# Patient Record
Sex: FEMALE | Race: WHITE | NOT HISPANIC OR LATINO | Employment: FULL TIME | URBAN - METROPOLITAN AREA
[De-identification: names, ages, dates, MRNs, and addresses within clinical notes are randomized per-mention and may not be internally consistent; named-entity substitution may affect disease eponyms.]

---

## 2017-02-01 ENCOUNTER — GENERIC CONVERSION - ENCOUNTER (OUTPATIENT)
Dept: OTHER | Facility: OTHER | Age: 52
End: 2017-02-01

## 2017-02-01 ENCOUNTER — ALLSCRIPTS OFFICE VISIT (OUTPATIENT)
Dept: OTHER | Facility: OTHER | Age: 52
End: 2017-02-01

## 2017-02-01 DIAGNOSIS — R10.2 PELVIC AND PERINEAL PAIN: ICD-10-CM

## 2017-02-01 DIAGNOSIS — Z12.31 ENCOUNTER FOR SCREENING MAMMOGRAM FOR MALIGNANT NEOPLASM OF BREAST: ICD-10-CM

## 2017-02-01 DIAGNOSIS — Z11.3 ENCOUNTER FOR SCREENING FOR INFECTIONS WITH PREDOMINANTLY SEXUAL MODE OF TRANSMISSION: ICD-10-CM

## 2017-02-01 DIAGNOSIS — Z01.419 ENCOUNTER FOR GYNECOLOGICAL EXAMINATION WITHOUT ABNORMAL FINDING: ICD-10-CM

## 2017-02-01 DIAGNOSIS — R14.0 ABDOMINAL DISTENSION (GASEOUS): ICD-10-CM

## 2017-02-01 DIAGNOSIS — Z13.1 ENCOUNTER FOR SCREENING FOR DIABETES MELLITUS: ICD-10-CM

## 2017-02-01 DIAGNOSIS — Z13.0 ENCOUNTER FOR SCREENING FOR DISEASES OF THE BLOOD AND BLOOD-FORMING ORGANS AND CERTAIN DISORDERS INVOLVING THE IMMUNE MECHANISM: ICD-10-CM

## 2017-02-01 DIAGNOSIS — Z11.59 ENCOUNTER FOR SCREENING FOR OTHER VIRAL DISEASES: ICD-10-CM

## 2017-02-01 DIAGNOSIS — Z13.220 ENCOUNTER FOR SCREENING FOR LIPOID DISORDERS: ICD-10-CM

## 2017-02-01 DIAGNOSIS — Z13.29 ENCOUNTER FOR SCREENING FOR OTHER SUSPECTED ENDOCRINE DISORDER: ICD-10-CM

## 2017-02-02 LAB
A/G RATIO (HISTORICAL): 1.4 (ref 1.1–2.5)
ALBUMIN SERPL BCP-MCNC: 4.1 G/DL (ref 3.5–5.5)
ALP SERPL-CCNC: 98 IU/L (ref 39–117)
ALT SERPL W P-5'-P-CCNC: 25 IU/L (ref 0–32)
AST SERPL W P-5'-P-CCNC: 22 IU/L (ref 0–40)
BASOPHILS # BLD AUTO: 0 X10E3/UL (ref 0–0.2)
BASOPHILS # BLD AUTO: 1 %
BILIRUB SERPL-MCNC: 0.4 MG/DL (ref 0–1.2)
BUN SERPL-MCNC: 14 MG/DL (ref 6–24)
BUN/CREA RATIO (HISTORICAL): 18 (ref 9–23)
CALCIUM SERPL-MCNC: 9.4 MG/DL (ref 8.7–10.2)
CHLORIDE SERPL-SCNC: 103 MMOL/L (ref 96–106)
CHOLEST SERPL-MCNC: 204 MG/DL (ref 100–199)
CO2 SERPL-SCNC: 22 MMOL/L (ref 18–29)
CREAT SERPL-MCNC: 0.78 MG/DL (ref 0.57–1)
DEPRECATED RDW RBC AUTO: 13 % (ref 12.3–15.4)
EGFR AFRICAN AMERICAN (HISTORICAL): 101 ML/MIN/1.73
EGFR-AMERICAN CALC (HISTORICAL): 88 ML/MIN/1.73
EOSINOPHIL # BLD AUTO: 0.1 X10E3/UL (ref 0–0.4)
EOSINOPHIL # BLD AUTO: 2 %
GLUCOSE SERPL-MCNC: 108 MG/DL (ref 65–99)
HCT VFR BLD AUTO: 39.4 % (ref 34–46.6)
HDLC SERPL-MCNC: 40 MG/DL
HGB BLD-MCNC: 13.3 G/DL (ref 11.1–15.9)
IMM.GRANULOCYTES (CD4/8) (HISTORICAL): 0 %
IMM.GRANULOCYTES (CD4/8) (HISTORICAL): 0 X10E3/UL (ref 0–0.1)
LDLC SERPL CALC-MCNC: 87 MG/DL (ref 0–99)
LYMPHOCYTES # BLD AUTO: 2.1 X10E3/UL (ref 0.7–3.1)
LYMPHOCYTES # BLD AUTO: 33 %
MCH RBC QN AUTO: 29.8 PG (ref 26.6–33)
MCHC RBC AUTO-ENTMCNC: 33.8 G/DL (ref 31.5–35.7)
MCV RBC AUTO: 88 FL (ref 79–97)
MONOCYTES # BLD AUTO: 0.4 X10E3/UL (ref 0.1–0.9)
MONOCYTES (HISTORICAL): 6 %
NEUTROPHILS # BLD AUTO: 3.7 X10E3/UL (ref 1.4–7)
NEUTROPHILS # BLD AUTO: 58 %
PLATELET # BLD AUTO: 247 X10E3/UL (ref 150–379)
POTASSIUM SERPL-SCNC: 4.4 MMOL/L (ref 3.5–5.2)
RBC (HISTORICAL): 4.47 X10E6/UL (ref 3.77–5.28)
SODIUM SERPL-SCNC: 142 MMOL/L (ref 134–144)
TOT. GLOBULIN, SERUM (HISTORICAL): 3 G/DL (ref 1.5–4.5)
TOTAL PROTEIN (HISTORICAL): 7.1 G/DL (ref 6–8.5)
TRIGL SERPL-MCNC: 386 MG/DL (ref 0–149)
VLDLC SERPL CALC-MCNC: 77 MG/DL (ref 5–40)
WBC # BLD AUTO: 6.3 X10E3/UL (ref 3.4–10.8)

## 2017-02-03 LAB — TSH SERPL DL<=0.05 MIU/L-ACNC: 2.18 UIU/ML (ref 0.45–4.5)

## 2017-02-06 ENCOUNTER — ALLSCRIPTS OFFICE VISIT (OUTPATIENT)
Dept: OTHER | Facility: OTHER | Age: 52
End: 2017-02-06

## 2017-02-08 ENCOUNTER — ALLSCRIPTS OFFICE VISIT (OUTPATIENT)
Dept: OTHER | Facility: OTHER | Age: 52
End: 2017-02-08

## 2017-02-10 LAB
ADEQUACY: (HISTORICAL): NORMAL
CHLAMYDIA DFA, NAA OR PCR (HISTORICAL): NEGATIVE
CLINICIAN PROVIDIED ICD 9 OR 10 (HISTORICAL): NORMAL
COMMENT (HISTORICAL): NORMAL
DIAGNOSIS (HISTORICAL): NORMAL
GC, DNA PROB (HISTORICAL): NEGATIVE
HPV HIGH RISK (HISTORICAL): NEGATIVE
NOTE: (HISTORICAL): NORMAL
PERFORMED BY (HISTORICAL): NORMAL
TEST INFORMATION (HISTORICAL): NORMAL

## 2017-02-23 ENCOUNTER — GENERIC CONVERSION - ENCOUNTER (OUTPATIENT)
Dept: OTHER | Facility: OTHER | Age: 52
End: 2017-02-23

## 2017-03-23 ENCOUNTER — GENERIC CONVERSION - ENCOUNTER (OUTPATIENT)
Dept: OTHER | Facility: OTHER | Age: 52
End: 2017-03-23

## 2017-04-26 ENCOUNTER — GENERIC CONVERSION - ENCOUNTER (OUTPATIENT)
Dept: OTHER | Facility: OTHER | Age: 52
End: 2017-04-26

## 2017-04-26 ENCOUNTER — ALLSCRIPTS OFFICE VISIT (OUTPATIENT)
Dept: OTHER | Facility: OTHER | Age: 52
End: 2017-04-26

## 2017-04-26 DIAGNOSIS — L02.214 CUTANEOUS ABSCESS OF GROIN: ICD-10-CM

## 2017-04-29 LAB
CULTURE RESULT (HISTORICAL): ABNORMAL
RESULT 1 (HISTORICAL): ABNORMAL
SUSCEP. REFLEX (HISTORICAL): ABNORMAL

## 2017-05-16 ENCOUNTER — HOSPITAL ENCOUNTER (OUTPATIENT)
Dept: MAMMOGRAPHY | Facility: MEDICAL CENTER | Age: 52
Discharge: HOME | End: 2017-05-16
Payer: COMMERCIAL

## 2017-05-16 DIAGNOSIS — Z12.31 ENCOUNTER FOR SCREENING MAMMOGRAM FOR MALIGNANT NEOPLASM OF BREAST: ICD-10-CM

## 2017-05-16 DIAGNOSIS — Z01.419 ENCOUNTER FOR GYNECOLOGICAL EXAMINATION WITHOUT ABNORMAL FINDING: ICD-10-CM

## 2017-05-16 PROCEDURE — 77063 BREAST TOMOSYNTHESIS BI: CPT

## 2017-05-16 PROCEDURE — G0202 SCR MAMMO BI INCL CAD: HCPCS

## 2017-05-23 ENCOUNTER — GENERIC CONVERSION - ENCOUNTER (OUTPATIENT)
Dept: OTHER | Facility: OTHER | Age: 52
End: 2017-05-23

## 2017-08-01 ENCOUNTER — ALLSCRIPTS OFFICE VISIT (OUTPATIENT)
Dept: OTHER | Facility: OTHER | Age: 52
End: 2017-08-01

## 2017-08-01 DIAGNOSIS — R14.0 ABDOMINAL DISTENSION (GASEOUS): ICD-10-CM

## 2017-08-01 DIAGNOSIS — R10.84 GENERALIZED ABDOMINAL PAIN: ICD-10-CM

## 2017-08-10 ENCOUNTER — HOSPITAL ENCOUNTER (OUTPATIENT)
Dept: ULTRASOUND IMAGING | Facility: HOSPITAL | Age: 52
Discharge: HOME | End: 2017-08-10
Attending: FAMILY MEDICINE
Payer: COMMERCIAL

## 2017-08-10 DIAGNOSIS — R10.84 GENERALIZED ABDOMINAL PAIN: ICD-10-CM

## 2017-08-10 DIAGNOSIS — R14.0 ABDOMINAL DISTENSION (GASEOUS): ICD-10-CM

## 2017-08-10 PROCEDURE — 76830 TRANSVAGINAL US NON-OB: CPT

## 2017-08-10 PROCEDURE — 76700 US EXAM ABDOM COMPLETE: CPT

## 2017-08-10 PROCEDURE — 76856 US EXAM PELVIC COMPLETE: CPT

## 2017-08-14 ENCOUNTER — GENERIC CONVERSION - ENCOUNTER (OUTPATIENT)
Dept: OTHER | Facility: OTHER | Age: 52
End: 2017-08-14

## 2017-08-30 ENCOUNTER — ALLSCRIPTS OFFICE VISIT (OUTPATIENT)
Dept: OTHER | Facility: OTHER | Age: 52
End: 2017-08-30

## 2018-01-09 NOTE — MISCELLANEOUS
Message   Recorded as Task   Date: 05/22/2017 09:24 PM, Created By: Trina Gillespie   Task Name: Follow Up   Assigned To: Amy Hayes   Regarding Patient: Wing Akbar, Status: Active   Comment:    Trina Gillespie - 22 May 2017 9:24 PM     TASK CREATED  pts 3D mammogram is normal, she does have an elevated breast cancer risk due to her dense tissue and fam hx   could consider genetic counseling ( unless her sister had negative testing), we can discuss other options at her yearly also   Yudith Jewell - 23 May 2017 3:20 PM     TASK EDITED  pt informed    she will talk with her sister and call back if she needs any info        Active Problems    1  Abdominal bloating (787 3) (R14 0)   2  Abscess of groin, left (682 2) (L02 214)   3  Allergic rhinitis (477 9) (J30 9)   4  Cervical cancer screening (V76 2) (Z12 4)   5  Elevated fasting glucose (790 21) (R73 01)   6  Encounter for screening colonoscopy (V76 51) (Z12 11)   7  Female pelvic pain (625 9) (R10 2)   8  Denied: History of self breast exam   9  Hypertriglyceridemia (272 1) (E78 1)   10  Mild asthma exacerbation (493 92) (J45 901)   11  Need for hepatitis C screening test (V73 89) (Z11 59)   12  Screen for colon cancer (V76 51) (Z12 11)   13  Upper respiratory infection (465 9) (J06 9)    Current Meds   1  LevoFLOXacin 500 MG Oral Tablet (Levaquin); TAKE 1 TABLET DAILY AS DIRECTED; Therapy: 88WAJ0123 to (Mayda Scott)  Requested for: 33YUZ4116; Last   RM:82CPB4980 Ordered   2  Sulfamethoxazole-Trimethoprim 800-160 MG Oral Tablet; TAKE 1 TABLET TWICE DAILY   WITH FOOD; Therapy: 60RYN3214 to (Sharmaine Wall)  Requested for: 93Oxl4234; Last   Rx:32Hrj5146 Ordered    Allergies    1   Animal dander - Cats    Signatures   Electronically signed by : [facility], ; May 23 2017  3:20PM EST                       (Author)

## 2018-01-11 NOTE — PROGRESS NOTES
Assessment    1  Encounter for preventive health examination (V70 0) (Z00 00)   2  Abdominal bloating (787 3) (R14 0)   3  Allergic rhinitis (477 9) (J30 9)   4  Upper respiratory infection (465 9) (J06 9)   5  Hypertriglyceridemia (272 1) (E78 1)   6  Elevated fasting glucose (790 21) (R73 01)   7  Need for hepatitis C screening test (V73 89) (Z11 59)    Plan  Abdominal bloating    · * US ABDOMEN COMPLETE; Status:Hold For - Scheduling; Requested for:44Tuo1877;   Elevated fasting glucose    · (LC) BMP8+eGFR; Status:Active; Requested for:08Feb2017;    · (LC) Hemoglobin A1c; Status:Active; Requested for:08Feb2017;   Hypertriglyceridemia    · (LC) Lipid Panel; Status:Active; Requested for:66Qkw0501;   Need for hepatitis C screening test    · (1) HEP C ANTIBODY; Status:Active; Requested for:62Mqn1421;     1  Health maintenance-the patient is up-to-date on her Pap, she had it yesterday with her GYN  She was given a prescription for a 3-D mammogram at that time which she will schedule  She has had her colonoscopy 2 years ago which she says was completely normal  She has now had her labs done and will only need hepatitis C screening with her next set of labs  2  Hypertriglyceridemia-we reviewed her lab results in detail and discussed that her HDL and LDL are excellent and her triglycerides were high  It only been fasting for 4 hours when she got her blood work done so we are going to repeat this in 3 months and she will fast overnight  She is obese and does seem to have a diet high in fat so she is also going to work on a lower fat diet in the meantime  3  Impaired fasting glucose-her sugar was 108 after fasting for 4 hours  She does admit that she has had a history of prediabetes in the past  When I repeat her blood work in 3 months and going to get an A1c at that time and repeat fasting sugar  4  Viral upper respiratory infection-the patient is already on an antibiotic for her abscess which is healing well   She has only been sick for a day now and I think that this is a viral upper respiratory infection in combination with some allergies  She is not doing anything for her allergies and I advised that she use nasal saline as needed  She does not like to take medications and does not want to take an antihistamine  5  Abdominal bloating and vomiting-the patient has a very strong family history of cholecystitis, both her siblings had their gallbladders removed  Her symptoms are always after eating and generally after a "heavy meal"  I am going to get an ultrasound of the abdomen to evaluate her gallbladder  Chief Complaint  45YEAR OLD PE  PATIENT IS NOT FEELING WELL  C/O SINUS PAIN AND CONGESTION  History of Present Illness  HPI: pt is here today for a physical   she saw her gyn yesterday  she had her blood work done the last time she was here  she has been taking te abx for her small groin abcess and this has resolved    she has not been sick for 1 day with itchy throat, sneezing, some sinus pressure  no fevers - feels warm now, she says  she sat under a vent working late at work last night and feels this did it    her main complaint is abd bloating  it happens after she eats certain things  happens with "heavy" things  it happens with pasta and bread, heavy fatty steaks  yoselin with wine as well  can't tolerate ham/pork  if she has these things she will vomit  dull ache at times in the middle of her abd  sometimes up to the right shoulder blade  she does have a family h/o gallstones      Review of Systems    Constitutional: as noted in HPI  Eyes: eyesight problems and wears glasses and needs an update  ENT: has TMJ, crooked bite from teen years; gets wax at times which causes clogging; has a feeling of fullness in her left ear a lot of the time for years, but as noted in HPI and no hearing loss  Cardiovascular: as noted in HPI and no chest pain     Respiratory: only SOB is due to inactivity, but no shortness of breath  Gastrointestinal: as noted in HPI  Genitourinary: no dysuria  Musculoskeletal: no arthralgias and no myalgias  Integumentary: no rashes  Neurological: headache, but as noted in HPI, no fainting and no difficulty walking  Psychiatric: no anxiety and no depression  ROS reviewed  Active Problems    1  Cervical cancer screening (V76 2) (Z12 4)   2  Female pelvic pain (625 9) (R10 2)   3  Denied: History of self breast exam   4   Mild asthma exacerbation (493 92) (J45 901)    Past Medical History    · History of Anxiety (300 00) (F41 9)   · History of  0 (V49 89) (Z78 9)   · History of depression (V11 8) (Z86 59)   · History of fatigue (V13 89) (T89 444)   · History of psoriasis (V13 3) (Z87 2)   · Denied: History of self breast exam   · History of sinusitis (V12 69) (Z87 09)   · History of urinary frequency (V13 09) (J93 365)   · History of wheezing (V12 69) (L94 722)   · History of Irregular heart beat (427 9) (I49 9)   · History of Non-healing skin lesion of nose (709 9) (L98 9)   · History of Screening for diabetes mellitus (V77 1) (Z13 1)   · History of Screening for hypothyroidism (V77 0) (Z13 29)   · History of Screening for iron deficiency anemia (V78 0) (Z13 0)   · History of SOB (shortness of breath) on exertion (786 05) (R06 02)   · History of Swollen R ankle (719 07) (M25 471)   · History of Weight gain (783 1) (R63 5)    Surgical History    · Denied: History Of Prior Surgery    Family History  Mother    · Family history of heart disease (V17 49) (Z80 55)  Father    · Family history of malignant melanoma (V16 8) (Z80 8)  Sister    · Family history of breast cancer (V16 3) (Z80 3)   · Family history of ovarian cancer (V16 41) (Z80 41)  Brother    · Family history of diabetes mellitus (V18 0) (Z83 3)    Social History    · Always uses seat belt   · Caffeine use (V49 89) (F15 90)   · Drinks coffee   · Drinks wine (V49 89) (Z78 9)   · Denied: History of Drug use   · Denied: History of Finding of    · Non smoker (V49 89) (Z78 9)   · Single   · Social alcohol use (Z78 9)    Current Meds   1  Cefuroxime Axetil 500 MG Oral Tablet; TAKE 1 TABLET EVERY 12 HOURS DAILY; Therapy: 23MWD7093 to (Evaluate:15Ljm7369)  Requested for: 94EVO0459; Last   Rx:2017 Ordered   2  ProAir  (90 Base) MCG/ACT Inhalation Aerosol Solution; INHALE 2 PUFFS   EVERY 4 HOURS AS NEEDED FOR COUGH AND WHEEZE;   Therapy: 52ESE5077 to (Last Rx:2017)  Requested for: 61PDD2687 Ordered    Allergies    1  Animal dander - Cats    Vitals   Recorded: 71WYW9083 06:09PM   Temperature 99 7 F   Heart Rate 94   Systolic 886   Diastolic 80   Height 5 ft 5 in   Weight 219 lb 4 oz   BMI Calculated 36 49   BSA Calculated 2 06     Physical Exam    Constitutional   General appearance: Abnormal   well developed, obese and well hydrated  Head and Face   Head and face: Normal     Palpation of the face and sinuses: No sinus tenderness  Eyes   Conjunctiva and lids: No swelling, erythema or discharge  Ears, Nose, Mouth, and Throat   External inspection of ears and nose: Normal     Otoscopic examination: Tympanic membranes translucent with normal light reflex  Canals patent without erythema  Nasal mucosa, septum, and turbinates: Abnormal   There was clear rhinorrhea from both nares  The bilateral nasal mucosa was red, but not boggy and not edematous  Oropharynx: Abnormal   The posterior pharynx was erythematous, but did not have an exudate  The tonsils were normal    Neck   Neck: Supple, symmetric, trachea midline, no masses  Thyroid: Normal, no thyromegaly  Pulmonary   Respiratory effort: No increased work of breathing or signs of respiratory distress  Auscultation of lungs: Clear to auscultation  Cardiovascular   Auscultation of heart: Normal rate and rhythm, normal S1 and S2, no murmurs      Examination of extremities for edema and/or varicosities: Normal     Abdomen   Abdomen: Abnormal   The abdomen was obese  Bowel sounds were normal  There was mild tenderness that was diffuse and in the epigastric area  There was a negative Bonner's sign  no masses palpated  Musculoskeletal   Gait and station: Normal     Joints, bones, and muscles: Normal     Range of motion: Normal     Stability: Normal     Skin   Skin and subcutaneous tissue: Normal without rashes or lesions  other than the lesion in her nostril noted in the prior visits office note  Neurologic   Coordination: Normal finger to nose and heel to shin  Psychiatric   Judgment and insight: Normal     Orientation to person, place, and time: Normal     Recent and remote memory: Intact      Mood and affect: Normal        Signatures   Electronically signed by : JOANN Garcia ; Feb 8 2017  6:56PM EST                       (Author)

## 2018-01-11 NOTE — RESULT NOTES
Verified Results  * US ABDOMEN COMPLETE 10Aug2017 07:56AM Rowena Juarez Order Number: [id number]    - Patient Instructions: To schedule this appointment, please contact Central Scheduling at [phone]  Test Name Result Flag Reference   US ABDOMEN COMPLETE (Report)     ABDOMEN ULTRASOUND, COMPLETE     INDICATION: 50-year-old female, pain and distention  COMPARISON: None  TECHNIQUE:  Real-time ultrasound of the abdomen was performed with a curvilinear transducer with both volumetric sweeps and still imaging techniques  FINDINGS:     PANCREAS: Visualized portions of the pancreas are within normal limits  AORTA AND IVC: Visualized portions are normal for patient age  LIVER:   Size: Mildly enlarged  The liver measures 17 9 cm in the midclavicular line  Contour: Surface contour is smooth  Parenchyma: There is mild diffuse increased echogenicity with smooth echotexture, without significant beam attenuation or loss of periportal echogenicity  Most consistent with mild hepatic steatosis  No evidence of suspicious mass  The main portal vein is patent and hepatopetal       BILIARY:   The gallbladder is normal in caliber  No wall thickening or pericholecystic fluid  No stones or sludge identified  Sonographic Omega Nyhan sign is negative  No intrahepatic biliary dilatation  CBD measures 2 6 mm  No choledocholithiasis  KIDNEY:    Right kidney measures 10 5 x 4 4 cm  Within normal limits  Left kidney measures 10 4 x 4 9 cm  Within normal limits  SPLEEN:    Measures 10 5 x 4 6 x 10 8 cm  Within normal limits  ASCITES: None  IMPRESSION:       1  Prominent, fatty liver  2  No cholelithiasis or biliary ductal obstruction         Workstation performed: [id number]     Signed by:   Medina Beckman MD   8/14/17

## 2018-01-12 VITALS
TEMPERATURE: 98.1 F | HEART RATE: 100 BPM | WEIGHT: 219.5 LBS | DIASTOLIC BLOOD PRESSURE: 76 MMHG | HEIGHT: 65 IN | OXYGEN SATURATION: 97 % | BODY MASS INDEX: 36.57 KG/M2 | SYSTOLIC BLOOD PRESSURE: 122 MMHG

## 2018-01-13 VITALS
TEMPERATURE: 99.7 F | HEIGHT: 65 IN | WEIGHT: 219.25 LBS | BODY MASS INDEX: 36.53 KG/M2 | SYSTOLIC BLOOD PRESSURE: 122 MMHG | DIASTOLIC BLOOD PRESSURE: 80 MMHG | HEART RATE: 94 BPM

## 2018-01-13 VITALS
HEIGHT: 65 IN | SYSTOLIC BLOOD PRESSURE: 126 MMHG | BODY MASS INDEX: 37.49 KG/M2 | WEIGHT: 225 LBS | TEMPERATURE: 97.6 F | DIASTOLIC BLOOD PRESSURE: 76 MMHG | HEART RATE: 87 BPM | OXYGEN SATURATION: 97 %

## 2018-01-13 VITALS
OXYGEN SATURATION: 98 % | DIASTOLIC BLOOD PRESSURE: 82 MMHG | HEART RATE: 77 BPM | TEMPERATURE: 98.6 F | BODY MASS INDEX: 36.4 KG/M2 | HEIGHT: 65 IN | SYSTOLIC BLOOD PRESSURE: 116 MMHG | WEIGHT: 218.5 LBS

## 2018-01-13 VITALS
HEIGHT: 65 IN | DIASTOLIC BLOOD PRESSURE: 78 MMHG | WEIGHT: 218 LBS | SYSTOLIC BLOOD PRESSURE: 118 MMHG | BODY MASS INDEX: 36.32 KG/M2

## 2018-01-15 VITALS
OXYGEN SATURATION: 98 % | WEIGHT: 221 LBS | HEART RATE: 90 BPM | HEIGHT: 66 IN | BODY MASS INDEX: 35.52 KG/M2 | SYSTOLIC BLOOD PRESSURE: 130 MMHG | DIASTOLIC BLOOD PRESSURE: 78 MMHG | TEMPERATURE: 97.1 F

## 2019-01-02 ENCOUNTER — OFFICE VISIT (OUTPATIENT)
Dept: FAMILY MEDICINE CLINIC | Facility: CLINIC | Age: 54
End: 2019-01-02
Payer: COMMERCIAL

## 2019-01-02 VITALS
OXYGEN SATURATION: 98 % | BODY MASS INDEX: 36.28 KG/M2 | WEIGHT: 218 LBS | SYSTOLIC BLOOD PRESSURE: 122 MMHG | HEART RATE: 77 BPM | DIASTOLIC BLOOD PRESSURE: 76 MMHG | TEMPERATURE: 97.8 F

## 2019-01-02 DIAGNOSIS — R73.01 ELEVATED FASTING GLUCOSE: ICD-10-CM

## 2019-01-02 DIAGNOSIS — E78.1 HYPERTRIGLYCERIDEMIA: ICD-10-CM

## 2019-01-02 DIAGNOSIS — R53.81 MALAISE: Primary | ICD-10-CM

## 2019-01-02 DIAGNOSIS — R51.9 NONINTRACTABLE EPISODIC HEADACHE, UNSPECIFIED HEADACHE TYPE: ICD-10-CM

## 2019-01-02 DIAGNOSIS — R11.0 NAUSEA: ICD-10-CM

## 2019-01-02 DIAGNOSIS — K75.81 NASH (NONALCOHOLIC STEATOHEPATITIS): ICD-10-CM

## 2019-01-02 DIAGNOSIS — R10.84 GENERALIZED ABDOMINAL PAIN: ICD-10-CM

## 2019-01-02 DIAGNOSIS — L30.9 DERMATITIS: ICD-10-CM

## 2019-01-02 PROBLEM — K58.2 IRRITABLE BOWEL SYNDROME WITH BOTH CONSTIPATION AND DIARRHEA: Status: ACTIVE | Noted: 2017-08-01

## 2019-01-02 PROBLEM — J30.9 ALLERGIC RHINITIS: Status: ACTIVE | Noted: 2017-02-08

## 2019-01-02 PROBLEM — D25.9 UTERINE MYOMA: Status: ACTIVE | Noted: 2017-08-30

## 2019-01-02 PROCEDURE — 99214 OFFICE O/P EST MOD 30 MIN: CPT | Performed by: FAMILY MEDICINE

## 2019-01-02 NOTE — PROGRESS NOTES
Subjective:   Chief Complaint   Patient presents with    GI Problem     Patient believes she is having a gallbladder issue  Cannot eat any fatty or heavy food without becoming nauseous and having pains near her right shoulder  States that her stool is smaller portions and darker when she has these episodes   Rash     blotchy, dry patches of skin all over the body  Patient states that she has been having a hard time clotting after cuts as well and has not been healing properly  Patient ID: Sean Jurado is a 46 y o  female  The pt is here today because she has been having some trouble with nausea and stomach upset    Heavy meats  Otis with wine  Feels tired, nauseas, no vomiting  Prick in the upper right shoulder  Generally does not feel well  No associated fevers  Will often happen the next day after eating these things  If she mixes it with wine it happens right away    She will have some changes in her stool when this happens  Seems smaller and darker   No heartburn  Headaches and malaise  General abd discomfort    She is realizing she has a dairy allergy  When she has heavy cream she gets nauseas    She is also getting red blotches on her skin - arms and legs  They don't itch    She has had some SOB in the am if she sleeps on her stomach  She has gained some weight     Colonoscopy 2015 - completely normal, 10 year follow up          The following portions of the patient's history were reviewed and updated as appropriate: allergies, current medications, past family history, past medical history, past social history, past surgical history and problem list     Review of Systems      Objective:  Vitals:    01/02/19 1811   BP: 122/76   Pulse: 77   Temp: 97 8 °F (36 6 °C)   SpO2: 98%   Weight: 98 9 kg (218 lb)      Physical Exam   Constitutional: She is oriented to person, place, and time  She appears well-developed and well-nourished  No distress     Neurological: She is alert and oriented to person, place, and time  No cranial nerve deficit  Coordination normal    Skin: Skin is warm and dry  No rash (Multiple erythematous irregularly-shaped macules on both her arms and legs, brighter erythema on the legs, some scale) noted  She is not diaphoretic  No erythema  Psychiatric: She has a normal mood and affect  Her behavior is normal  Judgment and thought content normal          Assessment/Plan:    No problem-specific Assessment & Plan notes found for this encounter  Diagnoses and all orders for this visit:    Malaise  Nausea  Dermatitis  -     CBC and differential; Future  -     Comprehensive metabolic panel; Future  -     Hepatitis C antibody; Future  -     TSH, 3rd generation with Free T4 reflex; Future  -     Lipid Panel with Direct LDL reflex; Future  -     Lyme Antibody Profile with reflex to WB; Future  -     NAMITA Screen w/ Reflex to Titer/Pattern; Future  -     Hemoglobin A1C; Future    Given the patient's multiple relatively vague symptoms I am going to go ahead and get a full panel of lab work including some rheumatologic labs  It is possible that the lesions on her arms and legs are fungal in nature and I advised she can use over-the-counter clotrimazole while we are waiting on lab results to see if this helps at all  Elevated fasting glucose  -     Hemoglobin A1C; Future    Hypertriglyceridemia  -     Lipid Panel with Direct LDL reflex; Future    CARR (nonalcoholic steatohepatitis)  -     Comprehensive metabolic panel; Future    Nonintractable episodic headache, unspecified headache type  -     Lyme Antibody Profile with reflex to WB; Future  -     NAMITA Screen w/ Reflex to Titer/Pattern; Future  -     Hemoglobin A1C; Future    Generalized abdominal pain  -     US abdomen complete;  Future

## 2019-01-03 NOTE — ASSESSMENT & PLAN NOTE
She is definitely concern that she could have diabetes  She has gained weight and does know that she had a slightly elevated sugar with her last set of labs over a year ago  She admits to eating quite a bit of sugar as well  She has been thinking that her rash could possibly be related to something like diabetes  I will go ahead and get an A1c with her upcoming lab work

## 2019-01-03 NOTE — ASSESSMENT & PLAN NOTE
She had a fatty liver on previous ultrasound when she was having generalized abdominal pain little over a year ago  If she continues to have this, especially if her liver enzymes are elevated and given her recent symptoms I will likely refer her to gastroenterology

## 2019-02-18 ENCOUNTER — APPOINTMENT (OUTPATIENT)
Dept: LAB | Facility: HOSPITAL | Age: 54
End: 2019-02-18
Attending: FAMILY MEDICINE
Payer: COMMERCIAL

## 2019-02-18 DIAGNOSIS — R53.81 MALAISE: ICD-10-CM

## 2019-02-18 DIAGNOSIS — R11.0 NAUSEA: ICD-10-CM

## 2019-02-18 DIAGNOSIS — K75.81 NASH (NONALCOHOLIC STEATOHEPATITIS): ICD-10-CM

## 2019-02-18 DIAGNOSIS — E78.1 HYPERTRIGLYCERIDEMIA: ICD-10-CM

## 2019-02-18 DIAGNOSIS — L30.9 DERMATITIS: ICD-10-CM

## 2019-02-18 DIAGNOSIS — R51.9 NONINTRACTABLE EPISODIC HEADACHE, UNSPECIFIED HEADACHE TYPE: ICD-10-CM

## 2019-02-18 DIAGNOSIS — R73.01 ELEVATED FASTING GLUCOSE: ICD-10-CM

## 2019-02-18 LAB
ALBUMIN SERPL BCP-MCNC: 3.8 G/DL (ref 3.5–5)
ALP SERPL-CCNC: 109 U/L (ref 46–116)
ALT SERPL W P-5'-P-CCNC: 27 U/L (ref 12–78)
ANION GAP SERPL CALCULATED.3IONS-SCNC: 10 MMOL/L (ref 4–13)
AST SERPL W P-5'-P-CCNC: 19 U/L (ref 5–45)
BASOPHILS # BLD AUTO: 0.05 THOUSANDS/ΜL (ref 0–0.1)
BASOPHILS NFR BLD AUTO: 1 % (ref 0–1)
BILIRUB SERPL-MCNC: 0.7 MG/DL (ref 0.2–1)
BUN SERPL-MCNC: 14 MG/DL (ref 5–25)
CALCIUM SERPL-MCNC: 8.5 MG/DL (ref 8.3–10.1)
CHLORIDE SERPL-SCNC: 105 MMOL/L (ref 100–108)
CHOLEST SERPL-MCNC: 200 MG/DL (ref 50–200)
CO2 SERPL-SCNC: 28 MMOL/L (ref 21–32)
CREAT SERPL-MCNC: 0.82 MG/DL (ref 0.6–1.3)
EOSINOPHIL # BLD AUTO: 0.08 THOUSAND/ΜL (ref 0–0.61)
EOSINOPHIL NFR BLD AUTO: 2 % (ref 0–6)
ERYTHROCYTE [DISTWIDTH] IN BLOOD BY AUTOMATED COUNT: 11.9 % (ref 11.6–15.1)
EST. AVERAGE GLUCOSE BLD GHB EST-MCNC: 117 MG/DL
GFR SERPL CREATININE-BSD FRML MDRD: 81 ML/MIN/1.73SQ M
GLUCOSE P FAST SERPL-MCNC: 98 MG/DL (ref 65–99)
HBA1C MFR BLD: 5.7 % (ref 4.2–6.3)
HCT VFR BLD AUTO: 41.6 % (ref 34.8–46.1)
HCV AB SER QL: NORMAL
HDLC SERPL-MCNC: 59 MG/DL (ref 40–60)
HGB BLD-MCNC: 14 G/DL (ref 11.5–15.4)
IMM GRANULOCYTES # BLD AUTO: 0.02 THOUSAND/UL (ref 0–0.2)
IMM GRANULOCYTES NFR BLD AUTO: 0 % (ref 0–2)
LDLC SERPL CALC-MCNC: 119 MG/DL (ref 0–100)
LYMPHOCYTES # BLD AUTO: 1.62 THOUSANDS/ΜL (ref 0.6–4.47)
LYMPHOCYTES NFR BLD AUTO: 30 % (ref 14–44)
MCH RBC QN AUTO: 29.8 PG (ref 26.8–34.3)
MCHC RBC AUTO-ENTMCNC: 33.7 G/DL (ref 31.4–37.4)
MCV RBC AUTO: 89 FL (ref 82–98)
MONOCYTES # BLD AUTO: 0.42 THOUSAND/ΜL (ref 0.17–1.22)
MONOCYTES NFR BLD AUTO: 8 % (ref 4–12)
NEUTROPHILS # BLD AUTO: 3.29 THOUSANDS/ΜL (ref 1.85–7.62)
NEUTS SEG NFR BLD AUTO: 59 % (ref 43–75)
NRBC BLD AUTO-RTO: 0 /100 WBCS
PLATELET # BLD AUTO: 203 THOUSANDS/UL (ref 149–390)
PMV BLD AUTO: 9.8 FL (ref 8.9–12.7)
POTASSIUM SERPL-SCNC: 4 MMOL/L (ref 3.5–5.3)
PROT SERPL-MCNC: 7.5 G/DL (ref 6.4–8.2)
RBC # BLD AUTO: 4.7 MILLION/UL (ref 3.81–5.12)
SODIUM SERPL-SCNC: 143 MMOL/L (ref 136–145)
TRIGL SERPL-MCNC: 112 MG/DL
TSH SERPL DL<=0.05 MIU/L-ACNC: 2.56 UIU/ML (ref 0.36–3.74)
WBC # BLD AUTO: 5.48 THOUSAND/UL (ref 4.31–10.16)

## 2019-02-18 PROCEDURE — 86038 ANTINUCLEAR ANTIBODIES: CPT

## 2019-02-18 PROCEDURE — 83036 HEMOGLOBIN GLYCOSYLATED A1C: CPT

## 2019-02-18 PROCEDURE — 85025 COMPLETE CBC W/AUTO DIFF WBC: CPT

## 2019-02-18 PROCEDURE — 80053 COMPREHEN METABOLIC PANEL: CPT

## 2019-02-18 PROCEDURE — 36415 COLL VENOUS BLD VENIPUNCTURE: CPT

## 2019-02-18 PROCEDURE — 84443 ASSAY THYROID STIM HORMONE: CPT

## 2019-02-18 PROCEDURE — 86803 HEPATITIS C AB TEST: CPT

## 2019-02-18 PROCEDURE — 80061 LIPID PANEL: CPT

## 2019-02-18 PROCEDURE — 86618 LYME DISEASE ANTIBODY: CPT

## 2019-02-19 LAB
B BURGDOR IGG SER IA-ACNC: 0.11
B BURGDOR IGM SER IA-ACNC: 0.26

## 2019-02-20 LAB — RYE IGE QN: NEGATIVE

## 2019-04-18 ENCOUNTER — TELEPHONE (OUTPATIENT)
Dept: FAMILY MEDICINE CLINIC | Facility: CLINIC | Age: 54
End: 2019-04-18

## 2019-07-03 ENCOUNTER — OFFICE VISIT (OUTPATIENT)
Dept: FAMILY MEDICINE CLINIC | Facility: CLINIC | Age: 54
End: 2019-07-03
Payer: COMMERCIAL

## 2019-07-03 VITALS
DIASTOLIC BLOOD PRESSURE: 78 MMHG | HEART RATE: 104 BPM | SYSTOLIC BLOOD PRESSURE: 104 MMHG | OXYGEN SATURATION: 97 % | WEIGHT: 221.25 LBS | HEIGHT: 66 IN | BODY MASS INDEX: 35.56 KG/M2 | TEMPERATURE: 97.4 F

## 2019-07-03 DIAGNOSIS — H93.12 TINNITUS, LEFT: ICD-10-CM

## 2019-07-03 DIAGNOSIS — Z00.00 ANNUAL PHYSICAL EXAM: Primary | ICD-10-CM

## 2019-07-03 DIAGNOSIS — R60.0 LOWER EXTREMITY EDEMA: ICD-10-CM

## 2019-07-03 DIAGNOSIS — Z12.39 SCREENING FOR BREAST CANCER: ICD-10-CM

## 2019-07-03 DIAGNOSIS — I87.2 PERIPHERAL VENOUS INSUFFICIENCY: ICD-10-CM

## 2019-07-03 DIAGNOSIS — L20.84 INTRINSIC ECZEMA: ICD-10-CM

## 2019-07-03 DIAGNOSIS — H83.02 INNER EAR INFLAMMATION, LEFT: ICD-10-CM

## 2019-07-03 DIAGNOSIS — R06.02 SHORTNESS OF BREATH: ICD-10-CM

## 2019-07-03 PROCEDURE — 99396 PREV VISIT EST AGE 40-64: CPT | Performed by: FAMILY MEDICINE

## 2019-07-03 RX ORDER — TRIAMCINOLONE ACETONIDE 1 MG/G
CREAM TOPICAL 2 TIMES DAILY
Qty: 80 G | Refills: 2 | Status: SHIPPED | OUTPATIENT
Start: 2019-07-03 | End: 2020-09-02 | Stop reason: ALTCHOICE

## 2019-07-03 NOTE — PROGRESS NOTES
ADULT ANNUAL PHYSICAL  [facility] PRACTICE    NAME: Edvin Del Angel  AGE: 46 y o  SEX: female  : 1965     DATE: 7/3/2019     Assessment and Plan:     Problem List Items Addressed This Visit        Cardiovascular and Mediastinum    Peripheral venous insufficiency     I strongly suspect that the pt has venous insufficiency but she is worried about her heart  Given the fact that she has bilateral lower extremity edema, some shortness of breath, and her mom has heart disease we can certainly get an echocardiogram an EKG and I think this is appropriate  That being said, I did advise that she get compression stockings to wear at work, she does sit all day  Musculoskeletal and Integument    Intrinsic eczema     Am going to give the patient some triamcinolone  If this does not work or if it works insufficiently we can certainly increase the potency of the steroid  If the steroid in general is not working I would refer her to Dermatology for further evaluation and possible biopsy  Relevant Medications    triamcinolone (KENALOG) 0 1 % cream       Other    Tinnitus, left     I am going to refer the patient to ENT because she does have more fluid behind the left tympanic membrane than the right  Relevant Orders    Ambulatory Referral to Otolaryngology      Other Visit Diagnoses     Annual physical exam    -  Primary    Shortness of breath        Relevant Orders    Echo complete with contrast if indicated    ECG 12 lead    Lower extremity edema        Relevant Orders    Echo complete with contrast if indicated    Inner ear inflammation, left        Relevant Orders    Ambulatory Referral to Otolaryngology    BMI 36 0-36 9,adult        Screening for breast cancer        Relevant Orders    Mammo screening bilateral w 3d & cad          Immunizations and preventive care screenings were discussed with patient today   Appropriate education was printed on patient's after visit summary  Counseling:  BMI Counseling: Body mass index is 36 26 kg/m²  Discussed the patient's BMI with her  The BMI is above average  BMI counseling and education was provided to the patient  Exercise recommendations include exercising 3-5 times per week  · Dental Health: discussed importance of regular tooth brushing, flossing, and dental visits  Return in 1 year (on 7/3/2020)  Chief Complaint:     Chief Complaint   Patient presents with    Annual Exam     pt here for physical       History of Present Illness:     Adult Annual Physical   Patient here for a comprehensive physical exam  The patient reports problems - see ROS  Diet and Physical Activity  · Diet/Nutrition: well balanced diet  · Exercise: no formal exercise  Depression Screening  PHQ-9 Depression Screening    PHQ-9:    Frequency of the following problems over the past two weeks:       Little interest or pleasure in doing things:  0 - not at all  Feeling down, depressed, or hopeless:  0 - not at all  PHQ-2 Score:  0       General Health  · Sleep: sleeps well  · Hearing: decreased - left  Also seems like she has tinnitus and has for years on the left - left ear also gets clogged  Feels water in the left ear canal at time  Also has TMJ on the left  · Vision: goes for regular eye exams and wears glasses  · Dental: no dental visits for >1 year and brushes teeth twice daily  /GYN Health  · Patient is: postmenopausal     Review of Systems:     Review of Systems   Constitutional: Negative for chills, fever and unexpected weight change  HENT: Negative for congestion, ear pain, hearing loss, postnasal drip, rhinorrhea and sore throat  Some nasal congestion   Eyes: Negative for visual disturbance  Respiratory: Negative for cough and shortness of breath           Feels her breathing has been more labored than it used to be, wonders if it is related to her weight - feels like she is wheezing at times with activity   Cardiovascular: Negative for chest pain  Gastrointestinal: Negative for abdominal pain, constipation and diarrhea  BMs are lighter and thinner then they used to be   Genitourinary: Negative for difficulty urinating  Musculoskeletal: Positive for neck stiffness (at times, yoselin from sitting at a computer all day and a fan blowing on her neck all day)  Negative for arthralgias and gait problem  Feels weakness in the left shoulder, feels like she is using her  in the hand on the left   Skin: Positive for rash (on arms and legs, thinks it might be eczema, used to get it on her scalp)  Neurological: Negative for light-headedness and headaches  Psychiatric/Behavioral: Negative for dysphoric mood and sleep disturbance  The patient is not nervous/anxious  Past Medical History:     History reviewed  No pertinent past medical history  Past Surgical History:     History reviewed  No pertinent surgical history     Social History:     Social History     Socioeconomic History    Marital status: Single     Spouse name: None    Number of children: None    Years of education: None    Highest education level: None   Occupational History    None   Social Needs    Financial resource strain: None    Food insecurity:     Worry: None     Inability: None    Transportation needs:     Medical: None     Non-medical: None   Tobacco Use    Smoking status: Never Smoker    Smokeless tobacco: Never Used   Substance and Sexual Activity    Alcohol use: Yes     Frequency: Never    Drug use: Never    Sexual activity: None   Lifestyle    Physical activity:     Days per week: None     Minutes per session: None    Stress: None   Relationships    Social connections:     Talks on phone: None     Gets together: None     Attends [employer] service: None     Active member of club or organization: None     Attends meetings of clubs or organizations: None     Relationship status: None    Intimate partner violence:     Fear of current or ex partner: None     Emotionally abused: None     Physically abused: None     Forced sexual activity: None   Other Topics Concern    None   Social History Narrative    None      Family History:     History reviewed  No pertinent family history  Current Medications:     Current Outpatient Medications   Medication Sig Dispense Refill    triamcinolone (KENALOG) 0 1 % cream Apply topically 2 (two) times a day 80 g 2     No current facility-administered medications for this visit  Allergies: Allergies   Allergen Reactions    Cat Hair Extract       Physical Exam:     /78   Pulse 104   Temp (!) 97 4 °F (36 3 °C)   Ht 5' 5 5" (1 664 m)   Wt 100 kg (221 lb 4 oz)   SpO2 97%   BMI 36 26 kg/m²     Physical Exam   Constitutional: She is oriented to person, place, and time  She appears well-developed and well-nourished  HENT:   Head: Normocephalic and atraumatic  Right Ear: External ear normal    Left Ear: External ear normal    Nose: Nose normal    Mouth/Throat: Oropharynx is clear and moist    Eyes: Pupils are equal, round, and reactive to light  Conjunctivae and EOM are normal    Neck: Normal range of motion  Neck supple  No thyroid mass and no thyromegaly present  Cardiovascular: Normal rate, regular rhythm and normal heart sounds  Pulmonary/Chest: Effort normal and breath sounds normal    Abdominal: Soft  Normal appearance  There is no tenderness  Musculoskeletal: Normal range of motion  She exhibits no edema  Lymphadenopathy:     She has no cervical adenopathy  Right: No supraclavicular adenopathy present  Neurological: She is alert and oriented to person, place, and time  Skin: Skin is warm, dry and intact  Psychiatric: She has a normal mood and affect  Her behavior is normal  Judgment and thought content normal    Nursing note and vitals reviewed        MD Raj David

## 2019-07-03 NOTE — ASSESSMENT & PLAN NOTE
I am going to refer the patient to ENT because she does have more fluid behind the left tympanic membrane than the right

## 2019-07-03 NOTE — ASSESSMENT & PLAN NOTE
Am going to give the patient some triamcinolone  If this does not work or if it works insufficiently we can certainly increase the potency of the steroid  If the steroid in general is not working I would refer her to Dermatology for further evaluation and possible biopsy

## 2019-07-03 NOTE — ASSESSMENT & PLAN NOTE
I strongly suspect that the pt has venous insufficiency but she is worried about her heart  Given the fact that she has bilateral lower extremity edema, some shortness of breath, and her mom has heart disease we can certainly get an echocardiogram an EKG and I think this is appropriate  That being said, I did advise that she get compression stockings to wear at work, she does sit all day

## 2019-07-03 NOTE — PATIENT INSTRUCTIONS

## 2019-07-30 ENCOUNTER — HOSPITAL ENCOUNTER (OUTPATIENT)
Dept: NON INVASIVE DIAGNOSTICS | Facility: CLINIC | Age: 54
Discharge: HOME | End: 2019-07-30
Payer: COMMERCIAL

## 2019-07-30 DIAGNOSIS — R60.0 LOWER EXTREMITY EDEMA: ICD-10-CM

## 2019-07-30 DIAGNOSIS — R06.02 SHORTNESS OF BREATH: ICD-10-CM

## 2019-07-30 PROCEDURE — 93306 TTE W/DOPPLER COMPLETE: CPT

## 2019-07-30 PROCEDURE — 93306 TTE W/DOPPLER COMPLETE: CPT | Performed by: INTERNAL MEDICINE

## 2020-09-02 ENCOUNTER — OFFICE VISIT (OUTPATIENT)
Dept: FAMILY MEDICINE CLINIC | Facility: HOSPITAL | Age: 55
End: 2020-09-02
Payer: COMMERCIAL

## 2020-09-02 VITALS
HEART RATE: 84 BPM | HEIGHT: 66 IN | SYSTOLIC BLOOD PRESSURE: 130 MMHG | WEIGHT: 223 LBS | TEMPERATURE: 98.2 F | DIASTOLIC BLOOD PRESSURE: 64 MMHG | RESPIRATION RATE: 16 BRPM | BODY MASS INDEX: 35.84 KG/M2

## 2020-09-02 DIAGNOSIS — K21.9 GASTROESOPHAGEAL REFLUX DISEASE WITHOUT ESOPHAGITIS: Primary | ICD-10-CM

## 2020-09-02 DIAGNOSIS — K75.81 NASH (NONALCOHOLIC STEATOHEPATITIS): ICD-10-CM

## 2020-09-02 DIAGNOSIS — R40.0 DAYTIME SOMNOLENCE: ICD-10-CM

## 2020-09-02 DIAGNOSIS — Z12.4 SCREENING FOR CERVICAL CANCER: ICD-10-CM

## 2020-09-02 DIAGNOSIS — Z12.31 OTHER SCREENING MAMMOGRAM: ICD-10-CM

## 2020-09-02 PROCEDURE — 3725F SCREEN DEPRESSION PERFORMED: CPT | Performed by: INTERNAL MEDICINE

## 2020-09-02 PROCEDURE — 99214 OFFICE O/P EST MOD 30 MIN: CPT | Performed by: INTERNAL MEDICINE

## 2020-09-02 RX ORDER — PANTOPRAZOLE SODIUM 20 MG/1
20 TABLET, DELAYED RELEASE ORAL
Qty: 30 TABLET | Refills: 5 | Status: SHIPPED | OUTPATIENT
Start: 2020-09-02 | End: 2020-10-21 | Stop reason: SDUPTHER

## 2020-09-02 NOTE — PROGRESS NOTES
Assessment/Plan:    CARR (nonalcoholic steatohepatitis)  Will check labs and US of abdomen    Gastroesophageal reflux disease without esophagitis  Pt has frequent heartburn, bleching, gasiness, odor of the mouth and a dry cough  Will try PPI  We discussed avoiding food that make it worse, decreasing caffeine intake  Diagnoses and all orders for this visit:    Gastroesophageal reflux disease without esophagitis  -     pantoprazole (PROTONIX) 20 mg tablet; Take 1 tablet (20 mg total) by mouth daily before breakfast  -     CBC and differential; Future    Other screening mammogram  -     Mammo screening bilateral w 3d & cad; Future    CARR (nonalcoholic steatohepatitis)  -     Comprehensive metabolic panel; Future  -     Protime-INR; Future  -     US right upper quadrant; Future    Screening for cervical cancer  -     Ambulatory referral to Gynecology; Future    BMI 36 0-36 9,adult    Daytime somnolence  -     Home Study; Future          Subjective:      Patient ID: Mercy Uribe is a 63 y o  female  Heartburn   She complains of belching, coughing (scant chronic) and heartburn  She reports no abdominal pain, no chest pain, no dysphagia, no nausea, no sore throat or no wheezing  This is a chronic problem  The current episode started more than 1 month ago  The problem occurs frequently  The problem has been gradually worsening  The symptoms are aggravated by certain foods and caffeine  Associated symptoms include fatigue (daytime somnolence)  Pertinent negatives include no melena or weight loss  Risk factors include caffeine use  She has tried nothing for the symptoms  The following portions of the patient's history were reviewed and updated as appropriate: current medications, past family history, past medical history, past social history, past surgical history and problem list     Review of Systems   Constitutional: Positive for fatigue (daytime somnolence)  Negative for fever and weight loss  HENT: Negative for congestion and sore throat  Eyes: Negative for visual disturbance  Respiratory: Positive for cough (scant chronic)  Negative for shortness of breath and wheezing  Cardiovascular: Negative for chest pain, palpitations and leg swelling  Gastrointestinal: Positive for heartburn  Negative for abdominal pain, blood in stool, diarrhea, dysphagia, melena and nausea  Endocrine: Negative for polydipsia and polyphagia  Genitourinary: Negative for difficulty urinating, dysuria, hematuria and vaginal discharge  Musculoskeletal: Negative for joint swelling, myalgias and neck stiffness  Skin: Negative for rash  Neurological: Negative for weakness, numbness and headaches  Hematological: Negative for adenopathy  Psychiatric/Behavioral: Negative for dysphoric mood  All other systems reviewed and are negative  Objective:    /64   Pulse 84   Temp 98 2 °F (36 8 °C) (Tympanic)   Resp 16   Ht 5' 5 5" (1 664 m)   Wt 101 kg (223 lb)   BMI 36 54 kg/m²      Physical Exam  HENT:      Head: Normocephalic  Eyes:      Conjunctiva/sclera: Conjunctivae normal    Neck:      Musculoskeletal: Neck supple  Cardiovascular:      Rate and Rhythm: Normal rate and regular rhythm  Heart sounds: No murmur  Pulmonary:      Effort: No respiratory distress  Breath sounds: No wheezing or rales  Abdominal:      General: Bowel sounds are normal       Palpations: Abdomen is soft  Tenderness: There is no abdominal tenderness  Skin:     General: Skin is warm and dry  Neurological:      Mental Status: She is alert and oriented to person, place, and time  Cranial Nerves: No cranial nerve deficit  Psychiatric:         Mood and Affect: Mood normal              Wendy Bejarano MD  Depression Screening Follow-up Plan: Patient's depression screening was positive with a PHQ-2 score of 0  Their PHQ-9 score was   Clinically patient does not have depression   No treatment is required  BMI Counseling: Body mass index is 36 54 kg/m²  The BMI is above normal  Nutrition recommendations include reducing portion sizes  Exercise recommendations include moderate aerobic physical activity for 150 minutes/week

## 2020-09-02 NOTE — ASSESSMENT & PLAN NOTE
Pt has frequent heartburn, bleching, gasiness, odor of the mouth and a dry cough  Will try PPI  We discussed avoiding food that make it worse, decreasing caffeine intake

## 2020-09-16 ENCOUNTER — TELEPHONE (OUTPATIENT)
Dept: SLEEP CENTER | Facility: CLINIC | Age: 55
End: 2020-09-16

## 2020-09-16 NOTE — TELEPHONE ENCOUNTER
----- Message from Keara Escobar DO sent at 9/14/2020 12:48 PM EDT -----  Chart reviewed  Study approved    ----- Message -----  From: Jeff Stone MA  Sent: 9/4/2020   8:00 AM EDT  To: Sleep Medicine [facility] Provider    This sleep study needs approval      If approved please sign and return to clerical pool  If denied please include reasons why  Also provide alternative testing if warranted  Please sign and return to clerical pool

## 2020-09-24 ENCOUNTER — HOSPITAL ENCOUNTER (OUTPATIENT)
Dept: SLEEP CENTER | Facility: CLINIC | Age: 55
Discharge: HOME | End: 2020-09-24
Payer: COMMERCIAL

## 2020-09-24 DIAGNOSIS — R40.0 DAYTIME SOMNOLENCE: ICD-10-CM

## 2020-09-24 PROCEDURE — G0399 HOME SLEEP TEST/TYPE 3 PORTA: HCPCS

## 2020-09-24 PROCEDURE — G0399 HOME SLEEP TEST/TYPE 3 PORTA: HCPCS | Performed by: INTERNAL MEDICINE

## 2020-09-25 NOTE — PROGRESS NOTES
Home Sleep Study Documentation    Pre-Sleep Home Study:    Set-up and instructions performed by: Refugio Cullen, CRT    Technician performed demonstration for Patient: yes    Return demonstration performed by Patient: yes    Written instructions provided to Patient: yes    Patient signed consent form: yes        Post-Sleep Home Study:    Additional comments by Patient: I got in bed late @ 11:30ish probably quarter of 12 before all set up  I woke and the finger cup was off  Don't know what happened  Home Sleep Study Failed:no: However pulse ox signal very unreliable  Failure reason: N/A    Reported or Detected: N/A    Scored by: HUE De Dios

## 2020-10-02 ENCOUNTER — LAB (OUTPATIENT)
Dept: LAB | Facility: MEDICAL CENTER | Age: 55
End: 2020-10-02
Payer: COMMERCIAL

## 2020-10-02 ENCOUNTER — TELEPHONE (OUTPATIENT)
Dept: SLEEP CENTER | Facility: CLINIC | Age: 55
End: 2020-10-02

## 2020-10-02 DIAGNOSIS — K21.9 GASTROESOPHAGEAL REFLUX DISEASE WITHOUT ESOPHAGITIS: ICD-10-CM

## 2020-10-02 DIAGNOSIS — K75.81 NASH (NONALCOHOLIC STEATOHEPATITIS): ICD-10-CM

## 2020-10-02 LAB
ALBUMIN SERPL BCP-MCNC: 4 G/DL (ref 3.5–5)
ALP SERPL-CCNC: 111 U/L (ref 46–116)
ALT SERPL W P-5'-P-CCNC: 29 U/L (ref 12–78)
ANION GAP SERPL CALCULATED.3IONS-SCNC: 4 MMOL/L (ref 4–13)
AST SERPL W P-5'-P-CCNC: 20 U/L (ref 5–45)
BASOPHILS # BLD AUTO: 0.05 THOUSANDS/ΜL (ref 0–0.1)
BASOPHILS NFR BLD AUTO: 1 % (ref 0–1)
BILIRUB SERPL-MCNC: 0.92 MG/DL (ref 0.2–1)
BUN SERPL-MCNC: 14 MG/DL (ref 5–25)
CALCIUM SERPL-MCNC: 9.4 MG/DL (ref 8.3–10.1)
CHLORIDE SERPL-SCNC: 108 MMOL/L (ref 100–108)
CO2 SERPL-SCNC: 28 MMOL/L (ref 21–32)
CREAT SERPL-MCNC: 0.9 MG/DL (ref 0.6–1.3)
EOSINOPHIL # BLD AUTO: 0.09 THOUSAND/ΜL (ref 0–0.61)
EOSINOPHIL NFR BLD AUTO: 2 % (ref 0–6)
ERYTHROCYTE [DISTWIDTH] IN BLOOD BY AUTOMATED COUNT: 12.2 % (ref 11.6–15.1)
GFR SERPL CREATININE-BSD FRML MDRD: 72 ML/MIN/1.73SQ M
GLUCOSE P FAST SERPL-MCNC: 101 MG/DL (ref 65–99)
HCT VFR BLD AUTO: 41.1 % (ref 34.8–46.1)
HGB BLD-MCNC: 13.4 G/DL (ref 11.5–15.4)
IMM GRANULOCYTES # BLD AUTO: 0.01 THOUSAND/UL (ref 0–0.2)
IMM GRANULOCYTES NFR BLD AUTO: 0 % (ref 0–2)
INR PPP: 0.97 (ref 0.84–1.19)
LYMPHOCYTES # BLD AUTO: 1.58 THOUSANDS/ΜL (ref 0.6–4.47)
LYMPHOCYTES NFR BLD AUTO: 27 % (ref 14–44)
MCH RBC QN AUTO: 29.8 PG (ref 26.8–34.3)
MCHC RBC AUTO-ENTMCNC: 32.6 G/DL (ref 31.4–37.4)
MCV RBC AUTO: 92 FL (ref 82–98)
MONOCYTES # BLD AUTO: 0.35 THOUSAND/ΜL (ref 0.17–1.22)
MONOCYTES NFR BLD AUTO: 6 % (ref 4–12)
NEUTROPHILS # BLD AUTO: 3.75 THOUSANDS/ΜL (ref 1.85–7.62)
NEUTS SEG NFR BLD AUTO: 64 % (ref 43–75)
NRBC BLD AUTO-RTO: 0 /100 WBCS
PLATELET # BLD AUTO: 203 THOUSANDS/UL (ref 149–390)
PMV BLD AUTO: 10.5 FL (ref 8.9–12.7)
POTASSIUM SERPL-SCNC: 4.4 MMOL/L (ref 3.5–5.3)
PROT SERPL-MCNC: 7.7 G/DL (ref 6.4–8.2)
PROTHROMBIN TIME: 12.9 SECONDS (ref 11.6–14.5)
RBC # BLD AUTO: 4.49 MILLION/UL (ref 3.81–5.12)
SODIUM SERPL-SCNC: 140 MMOL/L (ref 136–145)
WBC # BLD AUTO: 5.83 THOUSAND/UL (ref 4.31–10.16)

## 2020-10-02 PROCEDURE — 85025 COMPLETE CBC W/AUTO DIFF WBC: CPT

## 2020-10-02 PROCEDURE — 36415 COLL VENOUS BLD VENIPUNCTURE: CPT

## 2020-10-02 PROCEDURE — 85610 PROTHROMBIN TIME: CPT

## 2020-10-02 PROCEDURE — 80053 COMPREHEN METABOLIC PANEL: CPT

## 2020-10-07 ENCOUNTER — HOSPITAL ENCOUNTER (OUTPATIENT)
Dept: ULTRASOUND IMAGING | Facility: MEDICAL CENTER | Age: 55
Discharge: HOME | End: 2020-10-07
Payer: COMMERCIAL

## 2020-10-07 DIAGNOSIS — K75.81 NASH (NONALCOHOLIC STEATOHEPATITIS): ICD-10-CM

## 2020-10-07 PROCEDURE — 76705 ECHO EXAM OF ABDOMEN: CPT

## 2020-10-17 ENCOUNTER — HOSPITAL ENCOUNTER (OUTPATIENT)
Dept: MAMMOGRAPHY | Facility: CLINIC | Age: 55
Discharge: HOME | End: 2020-10-17
Payer: COMMERCIAL

## 2020-10-17 VITALS — HEIGHT: 66 IN | BODY MASS INDEX: 35.84 KG/M2 | WEIGHT: 223 LBS

## 2020-10-17 DIAGNOSIS — Z12.31 VISIT FOR SCREENING MAMMOGRAM: ICD-10-CM

## 2020-10-17 DIAGNOSIS — Z12.31 OTHER SCREENING MAMMOGRAM: ICD-10-CM

## 2020-10-17 PROCEDURE — 77067 SCR MAMMO BI INCL CAD: CPT

## 2020-10-17 PROCEDURE — 77063 BREAST TOMOSYNTHESIS BI: CPT

## 2020-10-21 ENCOUNTER — OFFICE VISIT (OUTPATIENT)
Dept: FAMILY MEDICINE CLINIC | Facility: HOSPITAL | Age: 55
End: 2020-10-21
Payer: COMMERCIAL

## 2020-10-21 VITALS
BODY MASS INDEX: 35.84 KG/M2 | TEMPERATURE: 96.8 F | RESPIRATION RATE: 16 BRPM | HEART RATE: 80 BPM | SYSTOLIC BLOOD PRESSURE: 110 MMHG | HEIGHT: 66 IN | DIASTOLIC BLOOD PRESSURE: 74 MMHG | WEIGHT: 223 LBS

## 2020-10-21 DIAGNOSIS — Z00.00 ANNUAL PHYSICAL EXAM: Primary | ICD-10-CM

## 2020-10-21 DIAGNOSIS — M54.50 ACUTE RIGHT-SIDED LOW BACK PAIN WITHOUT SCIATICA: ICD-10-CM

## 2020-10-21 DIAGNOSIS — K21.9 GASTROESOPHAGEAL REFLUX DISEASE WITHOUT ESOPHAGITIS: ICD-10-CM

## 2020-10-21 PROBLEM — H93.12 TINNITUS, LEFT: Status: RESOLVED | Noted: 2019-07-03 | Resolved: 2020-10-21

## 2020-10-21 PROBLEM — E78.1 HYPERTRIGLYCERIDEMIA: Status: RESOLVED | Noted: 2017-02-08 | Resolved: 2020-10-21

## 2020-10-21 PROBLEM — L20.84 INTRINSIC ECZEMA: Status: RESOLVED | Noted: 2019-07-03 | Resolved: 2020-10-21

## 2020-10-21 PROBLEM — J30.9 ALLERGIC RHINITIS: Status: RESOLVED | Noted: 2017-02-08 | Resolved: 2020-10-21

## 2020-10-21 PROCEDURE — 99396 PREV VISIT EST AGE 40-64: CPT | Performed by: INTERNAL MEDICINE

## 2020-10-21 PROCEDURE — 1036F TOBACCO NON-USER: CPT | Performed by: INTERNAL MEDICINE

## 2020-10-21 RX ORDER — PANTOPRAZOLE SODIUM 20 MG/1
20 TABLET, DELAYED RELEASE ORAL
Qty: 30 TABLET | Refills: 2 | Status: SHIPPED | OUTPATIENT
Start: 2020-10-21 | End: 2021-02-19

## 2021-04-09 ENCOUNTER — VBI (OUTPATIENT)
Dept: ADMINISTRATIVE | Facility: OTHER | Age: 56
End: 2021-04-09

## 2021-04-22 ENCOUNTER — RA CDI HCC (OUTPATIENT)
Dept: OTHER | Facility: HOSPITAL | Age: 56
End: 2021-04-22

## 2021-04-22 NOTE — PROGRESS NOTES
[facility]  coding opportunities             Chart reviewed, (number of) suggestions sent to provider: 1           Patients insurance company: [employer] (Medicare Advantage and Commercial)     Visit status: Patient ""no showed"" to the scheduled appointment        [facility]  coding opportunities             Chart reviewed, (number of) suggestions sent to provider: 1           Patients insurance company: [employer] [place] [address] (Medicare Advantage and Commercial)           E66 01: Morbid (severe) obesity due to excess calories ([facility] )

## 2021-04-28 ENCOUNTER — TELEPHONE (OUTPATIENT)
Dept: FAMILY MEDICINE CLINIC | Facility: HOSPITAL | Age: 56
End: 2021-04-28

## 2021-04-28 NOTE — TELEPHONE ENCOUNTER
Just any FYI  I spoke to pt  Pt said she made an appt at [facility] at [phone] in [place] for today for covid test at  440 pm    She is  having a cough & some SOB, little ha, some fatigue  I told her to call us to let us know the outcome and have [facility] fax us the result      dk

## 2021-05-03 NOTE — TELEPHONE ENCOUNTER
I called pt and she said that her covid test was negative  She is in the process of getting us the info for chart   dk

## 2021-09-16 ENCOUNTER — OFFICE VISIT (OUTPATIENT)
Dept: FAMILY MEDICINE CLINIC | Facility: HOSPITAL | Age: 56
End: 2021-09-16
Payer: COMMERCIAL

## 2021-09-16 ENCOUNTER — HOSPITAL ENCOUNTER (OUTPATIENT)
Dept: RADIOLOGY | Facility: HOSPITAL | Age: 56
Discharge: HOME | End: 2021-09-16
Attending: INTERNAL MEDICINE
Payer: COMMERCIAL

## 2021-09-16 VITALS
SYSTOLIC BLOOD PRESSURE: 138 MMHG | DIASTOLIC BLOOD PRESSURE: 84 MMHG | WEIGHT: 217 LBS | BODY MASS INDEX: 35.56 KG/M2 | HEART RATE: 81 BPM

## 2021-09-16 DIAGNOSIS — M54.50 CHRONIC RIGHT-SIDED LOW BACK PAIN WITHOUT SCIATICA: Primary | ICD-10-CM

## 2021-09-16 DIAGNOSIS — G89.29 CHRONIC RIGHT-SIDED LOW BACK PAIN WITHOUT SCIATICA: ICD-10-CM

## 2021-09-16 DIAGNOSIS — G89.29 CHRONIC RIGHT-SIDED LOW BACK PAIN WITHOUT SCIATICA: Primary | ICD-10-CM

## 2021-09-16 DIAGNOSIS — M54.50 CHRONIC RIGHT-SIDED LOW BACK PAIN WITHOUT SCIATICA: ICD-10-CM

## 2021-09-16 PROCEDURE — 1036F TOBACCO NON-USER: CPT | Performed by: INTERNAL MEDICINE

## 2021-09-16 PROCEDURE — 72070 X-RAY EXAM THORAC SPINE 2VWS: CPT

## 2021-09-16 PROCEDURE — 3725F SCREEN DEPRESSION PERFORMED: CPT | Performed by: INTERNAL MEDICINE

## 2021-09-16 PROCEDURE — 72110 X-RAY EXAM L-2 SPINE 4/>VWS: CPT

## 2021-09-16 PROCEDURE — 99214 OFFICE O/P EST MOD 30 MIN: CPT | Performed by: INTERNAL MEDICINE

## 2021-09-16 RX ORDER — METHOCARBAMOL 500 MG/1
TABLET, FILM COATED ORAL
Qty: 28 TABLET | Refills: 1 | Status: SHIPPED | OUTPATIENT
Start: 2021-09-16

## 2021-09-16 RX ORDER — MELOXICAM 15 MG/1
15 TABLET ORAL DAILY
Qty: 30 TABLET | Refills: 2 | Status: SHIPPED | OUTPATIENT
Start: 2021-09-16 | End: 2021-10-16

## 2021-09-16 NOTE — PATIENT INSTRUCTIONS
Acute lumbar pain  Active rest    Do activities as you are able without severe pain  Apply heat to local area few times a day  Take any medications ordered at visit  If pain changes or worsens, contact office    Physical therapy might help as back pain subsides

## 2021-09-16 NOTE — PROGRESS NOTES
Subjective:   Chief Complaint   Patient presents with    Back Pain     lower right back pain on/off x one month        Patient ID: Nannette Blount is a 61 y o  female  C/o right sided low back pain for about 1 year, symptoms are on and off  The following portions of the patient's history were reviewed and updated as appropriate: allergies, current medications, past family history, past medical history, past social history, past surgical history and problem list     Review of Systems   Constitutional: Negative for fatigue and fever  HENT: Negative for hearing loss  Eyes: Negative for visual disturbance  Respiratory: Negative for cough, chest tightness, shortness of breath and wheezing  Cardiovascular: Negative for chest pain, palpitations and leg swelling  Gastrointestinal: Negative for abdominal pain, diarrhea and nausea  Genitourinary: Negative for dysuria and hematuria  Musculoskeletal: Positive for arthralgias, back pain and myalgias  Neurological: Negative for dizziness, numbness and headaches  Psychiatric/Behavioral: Negative for confusion and dysphoric mood  All other systems reviewed and are negative          Current Outpatient Medications on File Prior to Visit   Medication Sig Dispense Refill    Ascorbic Acid (TELLY-C PO) Take by mouth      cyanocobalamin (VITAMIN B-12) 100 MCG tablet Take 100 mcg by mouth daily      Turmeric (CURCUMIN 95 PO) Take by mouth      VITAMIN D PO Take by mouth      azelastine (ASTELIN) 0 1 % nasal spray 2 sprays into each nostril 2 (two) times a day as needed for rhinitis or allergies Use in each nostril as directed (Patient not taking: Reported on 9/16/2021) 1 Bottle 5    diclofenac sodium (VOLTAREN) 1 % Apply 2 g topically 4 (four) times a day (Patient not taking: Reported on 2/19/2021) 100 g 1    famotidine (PEPCID) 40 MG tablet Take 1 tablet (40 mg total) by mouth daily at bedtime (Patient not taking: Reported on 9/16/2021) 30 tablet 3  pantoprazole (PROTONIX) 40 mg tablet Take 1 tablet (40 mg total) by mouth daily (Patient not taking: Reported on 9/16/2021) 30 tablet 3     No current facility-administered medications on file prior to visit  Objective:  Vitals:    09/16/21 1013   BP: 138/84   Pulse: 81   Weight: 98 4 kg (217 lb)      Physical Exam  Vitals and nursing note reviewed  Cardiovascular:      Rate and Rhythm: Normal rate and regular rhythm  Heart sounds: Normal heart sounds  Pulmonary:      Effort: Pulmonary effort is normal       Breath sounds: Normal breath sounds  Abdominal:      General: Bowel sounds are normal       Palpations: Abdomen is soft  Musculoskeletal:         General: Tenderness present  No deformity  Cervical back: Normal range of motion and neck supple  Assessment/Plan:    Chronic right-sided low back pain without sciatica  Suspect degenerative disc disease or arthritis in spine  Will get xrays initially as this has been worsening over time  rx for meloxicam and consider referral to PT        Diagnoses and all orders for this visit:    Chronic right-sided low back pain without sciatica  -     XR spine lumbar minimum 4 views non injury; Future  -     XR spine thoracic 2 vw; Future  -     meloxicam (MOBIC) 15 mg tablet; Take 1 tablet (15 mg total) by mouth daily  -     methocarbamol (ROBAXIN) 500 mg tablet;  Take one tablet at bedtime prn pain and take 1/2 tablet in AM prn    Body mass index (BMI) 35 0-35 9, adult

## 2021-09-16 NOTE — ASSESSMENT & PLAN NOTE
Suspect degenerative disc disease or arthritis in spine  Will get xrays initially as this has been worsening over time    rx for meloxicam and consider referral to PT

## 2021-09-28 NOTE — RESULT ENCOUNTER NOTE
Call   Maritza Zarco shows degenerative arthritis in med-back  I suggest she see pain management to address further

## 2022-01-18 ENCOUNTER — OFFICE VISIT (OUTPATIENT)
Dept: GASTROENTEROLOGY | Facility: CLINIC | Age: 57
End: 2022-01-18
Payer: COMMERCIAL

## 2022-01-18 VITALS
HEART RATE: 97 BPM | HEIGHT: 66 IN | DIASTOLIC BLOOD PRESSURE: 90 MMHG | BODY MASS INDEX: 34.72 KG/M2 | WEIGHT: 216 LBS | SYSTOLIC BLOOD PRESSURE: 138 MMHG

## 2022-01-18 DIAGNOSIS — R14.0 BLOATING SYMPTOM: Primary | ICD-10-CM

## 2022-01-18 DIAGNOSIS — E73.9 LACTOSE INTOLERANCE: ICD-10-CM

## 2022-01-18 DIAGNOSIS — Z12.11 COLON CANCER SCREENING: ICD-10-CM

## 2022-01-18 DIAGNOSIS — K76.0 FATTY LIVER: ICD-10-CM

## 2022-01-18 DIAGNOSIS — K58.9 IRRITABLE BOWEL SYNDROME, UNSPECIFIED TYPE: ICD-10-CM

## 2022-01-18 PROCEDURE — 3008F BODY MASS INDEX DOCD: CPT | Performed by: INTERNAL MEDICINE

## 2022-01-18 PROCEDURE — 1036F TOBACCO NON-USER: CPT | Performed by: INTERNAL MEDICINE

## 2022-01-18 PROCEDURE — 99203 OFFICE O/P NEW LOW 30 MIN: CPT | Performed by: INTERNAL MEDICINE

## 2022-01-18 NOTE — PROGRESS NOTES
[facility] - Outpatient Consultation  Sean Jurado 61 y o  female MRN: [id number]  Encounter: [id number]    ASSESSMENT AND PLAN:      1  Bloating symptom  --Patient with ongoing problems with abdominal discomfort right upper quadrant and some bloating  Also some back discomfort which is associated  Much worse with fatty food  Has been going on for years  She has had negative ultrasounds in the past the last being in the fall of 2020  Differential diagnosis could include gallbladder dysfunction, gastro paresis, GERD and celiac disease  Will order studies as mentioned  - US abdomen complete; Future  - CBC and differential; Future  - Comprehensive metabolic panel; Future  - Celiac Disease Panel; Future  - NM hepatobiliary w rx; Future    -  Could consider gastro paresis as well  Hold on ordering gastric emptying study  Patient does have some symptoms of early satiety    - will give patient a low FODMAP diet just to see if this may be helpful to in the event studies are not    2  Fatty liver  -- has been diagnosed by ultrasound in the past   Liver function studies have been normal    3  Lactose intolerance  -- patient reports problems with milk so she avoids and problems with dairy product    4  Colon cancer screening  - patient reports negative colonoscopy and also hospital age 47  She would be due again till she was 59    5  Irritable bowel syndrome, unspecified type  -- patient with intermittent spasms  Does have relief of left-sided abdominal pain with a bowel movement at times  Does move her bowels about twice a day  Occasional constipation  Has been told in the past that she has irritable bowel syndrome  - could consider course of probiotics  Antispasmodics may be helpful but patient seems  Reluctant for pharmacologic intervention    Could consider trial of peppermint supplement  or green tea      Followup Appointment:  3 mo ______________________________________________________________________    Chief Complaint   Patient presents with   Davie White     thinks its gallbladder       HPI:  Patient comes to the office for evaluation of abdominal discomfort and abdominal bloating  Patient refills she really does not feel well from a digestive point of view  If she has any kind of fatty foods she does not feel well after eating  She does have some increased belching right-sided abdominal discomfort and back discomfort  These seem to be associated with the a fatty meal   She does feel full after eating  Patient has had previous evaluation with gallbladder ultrasonography which has been negative  She has been noted to have fatty liver with normal liver function studies  She does have some symptoms of fatigue  Patient does report that if she drinks any thing like red wine this really kind of sets are offer causes problems  Patient has been diagnosed with irritable bowel syndrome in the past   She will have some crampy spasmodic type abdominal pain  This is relieved with passing gas or having a bowel movement  Patient reports having foul-smelling flatus  To does report intolerance to lactose  She has never been checked for celiac disease  Overall she just feels sluggish and feels as though her digestive system is not functioning properly  She  prefers alternative remedies to medical problems as opposed to pharmacologic intervention          Past Medical History:   Diagnosis Date    IBS (irritable bowel syndrome)      Past Surgical History:   Procedure Laterality Date    BREAST BIOPSY Left 2013    NEG RESULTS    BREAST SURGERY      biopsy dense mass--negative result---2010     Social History     Substance and Sexual Activity   Alcohol Use Yes     Social History     Substance and Sexual Activity   Drug Use Never     Social History     Tobacco Use   Smoking Status Never Smoker   Smokeless Tobacco Never Used     Family History Problem Relation Age of Onset    Substance Abuse Father     Alcohol abuse Father     Melanoma Father     Colon polyps Father     Alcohol abuse Sister     Substance Abuse Sister         in remission    Cholelithiasis Sister    Leonides Silva Breast cancer Sister     Gallbladder disease Sister     Substance Abuse Brother         active alcoholic    Alcohol abuse Brother     Gallbladder disease Brother     Alcohol abuse Brother     Substance Abuse Brother         in remission    Gallbladder disease Brother     Alcohol abuse Brother     Substance Abuse Brother         in remission    Alcohol abuse Brother     Substance Abuse Brother         in remission    Cholelithiasis Mother     Hypertension Mother     Abdominal aortic aneurysm Mother     Gallbladder disease Mother     No Known Problems Maternal Grandmother     No Known Problems Maternal Grandfather     No Known Problems Paternal Grandmother     No Known Problems Paternal Grandfather     No Known Problems Maternal Aunt     No Known Problems Maternal Aunt     Liver cancer Cousin     Mental illness Neg Hx        Meds/Allergies     Current Outpatient Medications:     Ascorbic Acid (TELLY-C PO)    co-enzyme Q-10 30 MG capsule    cyanocobalamin (VITAMIN B-12) 100 MCG tablet    VITAMIN D PO    azelastine (ASTELIN) 0 1 % nasal spray    diclofenac sodium (VOLTAREN) 1 %    meloxicam (MOBIC) 15 mg tablet    methocarbamol (ROBAXIN) 500 mg tablet    Turmeric (CURCUMIN 95 PO)    Allergies   Allergen Reactions    Cat Hair Extract      Cat dander    Dust Mite Extract      dust    Lactose - Food Allergy      Heavy creams and milk       PHYSICAL EXAM:    Blood pressure 138/90, pulse 97, height 5' 5 5" (1 664 m), weight 98 kg (216 lb)  Body mass index is 35 4 kg/m²  General Appearance: NAD, cooperative, alert  Eyes: Anicteric,  Conjunctiva pink  ENT:  Normocephalic, atraumatic, normal mucosa      Neck:  Supple, symmetrical, trachea midline,   Resp:  Clear to auscultation bilaterally; no rales, rhonchi or wheezing; respirations unlabored   CV:  S1 S2, Regular rate and rhythm; no murmur, rub, or gallop  GI:  Soft, non-tender, non-distended; normal bowel sounds; no masses, no organomegaly   Rectal: Deferred  Musculoskeletal: No cyanosis, clubbing - positive for Trace pedal edema  Normal ROM  Skin:  No jaundice, rashes, or lesions   Heme/Lymph: No palpable cervical lymphadenopathy  Psych: Normal affect, good eye contact  Neuro: No gross deficits, AAOx3    Lab Results:   Lab Results   Component Value Date    WBC 5 83 10/02/2020    HGB 13 4 10/02/2020    HCT 41 1 10/02/2020    MCV 92 10/02/2020     10/02/2020             REVIEW OF SYSTEMS:    CONSTITUTIONAL: Denies any fever, chills, positive for weight gain and fatigue  HEENT: No earache or tinnitus  Denies hearing loss or visual disturbances  CARDIOVASCULAR: No chest pain or palpitations  Positive for pedal edema   RESPIRATORY: Denies any cough, hemoptysis, shortness of breath or dyspnea on exertion  GASTROINTESTINAL: As noted in the History of Present Illness  - feels not well after eating  GENITOURINARY: No problems with urination  Denies any hematuria or dysuria  NEUROLOGIC: No dizziness or vertigo, denies headaches  MUSCULOSKELETAL:  Positive for back pain positive for left hip pain   SKIN: Denies skin rashes or itching  ENDOCRINE: Denies excessive thirst  Denies intolerance to heat or cold  PSYCHOSOCIAL: Denies depression or anxiety  Denies any recent memory loss

## 2022-01-18 NOTE — LETTER
January 18, 2022     [place], [address]  [address] [address]    Patient: Fabian Sterling   YOB: 1965   Date of Visit: 1/18/2022       Dear Dr May Ruffin: Thank you for referring Irene Neal to me for evaluation  Below are my notes for this consultation  If you have questions, please do not hesitate to call me  I look forward to following your patient along with you  Sincerely,        Miguelina Moreno MD        CC: No Recipients  Miguelina Moreno MD  1/18/2022  3:33 PM  Sign when Signing Visit    [facility] - Outpatient Consultation  Fabian Sterling 61 y o  female MRN: [id number]  Encounter: [id number]    ASSESSMENT AND PLAN:      1  Bloating symptom  --Patient with ongoing problems with abdominal discomfort right upper quadrant and some bloating  Also some back discomfort which is associated  Much worse with fatty food  Has been going on for years  She has had negative ultrasounds in the past the last being in the fall of 2020  Differential diagnosis could include gallbladder dysfunction, gastro paresis, GERD and celiac disease  Will order studies as mentioned  - US abdomen complete; Future  - CBC and differential; Future  - Comprehensive metabolic panel; Future  - Celiac Disease Panel; Future  - NM hepatobiliary w rx; Future    -  Could consider gastro paresis as well  Hold on ordering gastric emptying study  Patient does have some symptoms of early satiety    - will give patient a low FODMAP diet just to see if this may be helpful to in the event studies are not    2  Fatty liver  -- has been diagnosed by ultrasound in the past   Liver function studies have been normal    3  Lactose intolerance  -- patient reports problems with milk so she avoids and problems with dairy product    4  Colon cancer screening  - patient reports negative colonoscopy and also hospital age 47   She would be due again till she was 59    5  Irritable bowel syndrome, unspecified type  -- patient with intermittent spasms  Does have relief of left-sided abdominal pain with a bowel movement at times  Does move her bowels about twice a day  Occasional constipation  Has been told in the past that she has irritable bowel syndrome  - could consider course of probiotics  Antispasmodics may be helpful but patient seems  Reluctant for pharmacologic intervention  Could consider trial of peppermint supplement  or green tea      Followup Appointment:  3 mo   ______________________________________________________________________    Chief Complaint   Patient presents with   Tony      thinks its gallbladder       HPI:  Patient comes to the office for evaluation of abdominal discomfort and abdominal bloating  Patient refills she really does not feel well from a digestive point of view  If she has any kind of fatty foods she does not feel well after eating  She does have some increased belching right-sided abdominal discomfort and back discomfort  These seem to be associated with the a fatty meal   She does feel full after eating  Patient has had previous evaluation with gallbladder ultrasonography which has been negative  She has been noted to have fatty liver with normal liver function studies  She does have some symptoms of fatigue  Patient does report that if she drinks any thing like red wine this really kind of sets are offer causes problems  Patient has been diagnosed with irritable bowel syndrome in the past   She will have some crampy spasmodic type abdominal pain  This is relieved with passing gas or having a bowel movement  Patient reports having foul-smelling flatus  To does report intolerance to lactose  She has never been checked for celiac disease  Overall she just feels sluggish and feels as though her digestive system is not functioning properly    She  prefers alternative remedies to medical problems as opposed to pharmacologic intervention          Past Medical History:   Diagnosis Date    IBS (irritable bowel syndrome)      Past Surgical History:   Procedure Laterality Date    BREAST BIOPSY Left 2013    NEG RESULTS    BREAST SURGERY      biopsy dense mass--negative result---2010     Social History     Substance and Sexual Activity   Alcohol Use Yes     Social History     Substance and Sexual Activity   Drug Use Never     Social History     Tobacco Use   Smoking Status Never Smoker   Smokeless Tobacco Never Used     Family History   Problem Relation Age of Onset    Substance Abuse Father     Alcohol abuse Father     Melanoma Father     Colon polyps Father     Alcohol abuse Sister     Substance Abuse Sister         in remission    Cholelithiasis Sister     Breast cancer Sister     Gallbladder disease Sister     Substance Abuse Brother         active alcoholic    Alcohol abuse Brother     Gallbladder disease Brother     Alcohol abuse Brother     Substance Abuse Brother         in remission    Gallbladder disease Brother     Alcohol abuse Brother     Substance Abuse Brother         in remission    Alcohol abuse Brother     Substance Abuse Brother         in remission    Cholelithiasis Mother     Hypertension Mother     Abdominal aortic aneurysm Mother     Gallbladder disease Mother     No Known Problems Maternal Grandmother     No Known Problems Maternal Grandfather     No Known Problems Paternal Grandmother     No Known Problems Paternal Grandfather     No Known Problems Maternal Aunt     No Known Problems Maternal Aunt     Liver cancer Cousin     Mental illness Neg Hx        Meds/Allergies     Current Outpatient Medications:     Ascorbic Acid (TELLY-C PO)    co-enzyme Q-10 30 MG capsule    cyanocobalamin (VITAMIN B-12) 100 MCG tablet    VITAMIN D PO    azelastine (ASTELIN) 0 1 % nasal spray    diclofenac sodium (VOLTAREN) 1 %    meloxicam (MOBIC) 15 mg tablet    methocarbamol (ROBAXIN) 500 mg tablet    Turmeric (CURCUMIN 95 PO)    Allergies   Allergen Reactions    Cat Hair Extract      Cat dander    Dust Mite Extract      dust    Lactose - Food Allergy      Heavy creams and milk       PHYSICAL EXAM:    Blood pressure 138/90, pulse 97, height 5' 5 5" (1 664 m), weight 98 kg (216 lb)  Body mass index is 35 4 kg/m²  General Appearance: NAD, cooperative, alert  Eyes: Anicteric,  Conjunctiva pink  ENT:  Normocephalic, atraumatic, normal mucosa  Neck:  Supple, symmetrical, trachea midline,   Resp:  Clear to auscultation bilaterally; no rales, rhonchi or wheezing; respirations unlabored   CV:  S1 S2, Regular rate and rhythm; no murmur, rub, or gallop  GI:  Soft, non-tender, non-distended; normal bowel sounds; no masses, no organomegaly   Rectal: Deferred  Musculoskeletal: No cyanosis, clubbing - positive for Trace pedal edema  Normal ROM  Skin:  No jaundice, rashes, or lesions   Heme/Lymph: No palpable cervical lymphadenopathy  Psych: Normal affect, good eye contact  Neuro: No gross deficits, AAOx3    Lab Results:   Lab Results   Component Value Date    WBC 5 83 10/02/2020    HGB 13 4 10/02/2020    HCT 41 1 10/02/2020    MCV 92 10/02/2020     10/02/2020             REVIEW OF SYSTEMS:    CONSTITUTIONAL: Denies any fever, chills, positive for weight gain and fatigue  HEENT: No earache or tinnitus  Denies hearing loss or visual disturbances  CARDIOVASCULAR: No chest pain or palpitations  Positive for pedal edema   RESPIRATORY: Denies any cough, hemoptysis, shortness of breath or dyspnea on exertion  GASTROINTESTINAL: As noted in the History of Present Illness  - feels not well after eating  GENITOURINARY: No problems with urination  Denies any hematuria or dysuria  NEUROLOGIC: No dizziness or vertigo, denies headaches  MUSCULOSKELETAL:  Positive for back pain positive for left hip pain   SKIN: Denies skin rashes or itching     ENDOCRINE: Denies excessive thirst  Denies intolerance to heat or cold  PSYCHOSOCIAL: Denies depression or anxiety  Denies any recent memory loss

## 2022-01-18 NOTE — PATIENT INSTRUCTIONS
[address] [facility] - Outpatient Consultation  Anette Walters 61 y o  female MRN: [id number]  Encounter: [id number]    ASSESSMENT AND PLAN:      1  Bloating symptom  --Patient with ongoing problems with abdominal discomfort right upper quadrant and some bloating  Also some back discomfort which is associated  Much worse with fatty food  Has been going on for years  She has had negative ultrasounds in the past the last being in the fall of 2020  Differential diagnosis could include gallbladder dysfunction, gastro paresis, GERD and celiac disease  Will order studies as mentioned  - US abdomen complete; Future  - CBC and differential; Future  - Comprehensive metabolic panel; Future  - Celiac Disease Panel; Future  - NM hepatobiliary w rx; Future    -  Could consider gastro paresis as well  Hold on ordering gastric emptying study  Patient does have some symptoms of early satiety    - will give patient a low FODMAP diet just to see if this may be helpful to in the event studies are not    2  Fatty liver  -- has been diagnosed by ultrasound in the past   Liver function studies have been normal    3  Lactose intolerance  -- patient reports problems with milk so she avoids and problems with dairy product    4  Colon cancer screening  - patient reports negative colonoscopy and also hospital age 47  She would be due again till she was 59    5  Irritable bowel syndrome, unspecified type  -- patient with intermittent spasms  Does have relief of left-sided abdominal pain with a bowel movement at times  Does move her bowels about twice a day  Occasional constipation  Has been told in the past that she has irritable bowel syndrome  - could consider course of probiotics  Antispasmodics may be helpful but patient seems  Reluctant for pharmacologic intervention    Could consider trial of peppermint supplement  or green tea      Followup Appointment:  3 mo

## 2022-01-18 NOTE — LETTER
January 18, 2022     Lokesh Henry, Shanthi Raines  [address] [address]    Patient: Anette Walters   YOB: 1965   Date of Visit: 1/18/2022       Dear Dr Fidel Arguelles: Thank you for referring Venecia Hernandez to me for evaluation  Below are my notes for this consultation  If you have questions, please do not hesitate to call me  I look forward to following your patient along with you  Sincerely,        Tiera Pena MD        CC: No Recipients  Tiera Pena MD  1/18/2022  2:35 PM  Sign when Signing Visit    [address] [facility] [place] [facility] - Outpatient Consultation  Anette Walters 61 y o  female MRN: [id number]  Encounter: [id number]    ASSESSMENT AND PLAN:      1  Bloating symptom  --Patient with ongoing problems with abdominal discomfort right upper quadrant and some bloating  Also some back discomfort which is associated  Much worse with fatty food  Has been going on for years  She has had negative ultrasounds in the past the last being in the fall of 2020  Differential diagnosis could include gallbladder dysfunction, gastro paresis, GERD and celiac disease  Will order studies as mentioned  - US abdomen complete; Future  - CBC and differential; Future  - Comprehensive metabolic panel; Future  - Celiac Disease Panel; Future  - NM hepatobiliary w rx; Future    -  Could consider gastro paresis as well  Hold on ordering gastric emptying study  Patient does have some symptoms of early satiety    - will give patient a low FODMAP diet just to see if this may be helpful to in the event studies are not    2  Fatty liver  -- has been diagnosed by ultrasound in the past   Liver function studies have been normal    3  Lactose intolerance  -- patient reports problems with milk so she avoids and problems with dairy product    4  Colon cancer screening  - patient reports negative colonoscopy and also hospital age 47   She would be due again till she was 59    5  Irritable bowel syndrome, unspecified type  -- patient with intermittent spasms  Does have relief of left-sided abdominal pain with a bowel movement at times  Does move her bowels about twice a day  Occasional constipation  Has been told in the past that she has irritable bowel syndrome  - could consider course of probiotics  Antispasmodics may be helpful but patient seems  Reluctant for pharmacologic intervention  Could consider trial of peppermint supplement  or green tea      Followup Appointment:  3 mo   ______________________________________________________________________    Chief Complaint   Patient presents with   Trell Fong     thinks its gallbladder       HPI:  Patient comes to the office for evaluation of abdominal discomfort and abdominal bloating  Patient refills she really does not feel well from a digestive point of view  If she has any kind of fatty foods she does not feel well after eating  She does have some increased belching right-sided abdominal discomfort and back discomfort  These seem to be associated with the a fatty meal   She does feel full after eating  Patient has had previous evaluation with gallbladder ultrasonography which has been negative  She has been noted to have fatty liver with normal liver function studies  She does have some symptoms of fatigue  Patient does report that if she drinks any thing like red wine this really kind of sets are offer causes problems  Patient has been diagnosed with irritable bowel syndrome in the past   She will have some crampy spasmodic type abdominal pain  This is relieved with passing gas or having a bowel movement  Patient reports having foul-smelling flatus  To does report intolerance to lactose  She has never been checked for celiac disease  Overall she just feels sluggish and feels as though her digestive system is not functioning properly    She  prefers alternative remedies to medical problems as opposed to pharmacologic intervention        esents ***Historical Information   Past Medical History:   Diagnosis Date    IBS (irritable bowel syndrome)      Past Surgical History:   Procedure Laterality Date    BREAST BIOPSY Left 2013    NEG RESULTS    BREAST SURGERY      biopsy dense mass--negative result---2010     Social History     Substance and Sexual Activity   Alcohol Use Yes     Social History     Substance and Sexual Activity   Drug Use Never     Social History     Tobacco Use   Smoking Status Never Smoker   Smokeless Tobacco Never Used     Family History   Problem Relation Age of Onset    Substance Abuse Father     Alcohol abuse Father     Melanoma Father     Colon polyps Father     Alcohol abuse Sister     Substance Abuse Sister         in remission    Cholelithiasis Sister     Breast cancer Sister     Gallbladder disease Sister     Substance Abuse Brother         active alcoholic    Alcohol abuse Brother     Gallbladder disease Brother     Alcohol abuse Brother     Substance Abuse Brother         in remission    Gallbladder disease Brother     Alcohol abuse Brother     Substance Abuse Brother         in remission    Alcohol abuse Brother     Substance Abuse Brother         in remission    Cholelithiasis Mother     Hypertension Mother     Abdominal aortic aneurysm Mother     Gallbladder disease Mother     No Known Problems Maternal Grandmother     No Known Problems Maternal Grandfather     No Known Problems Paternal Grandmother     No Known Problems Paternal Grandfather     No Known Problems Maternal Aunt     No Known Problems Maternal Aunt     Liver cancer Cousin     Mental illness Neg Hx        Meds/Allergies     Current Outpatient Medications:     Ascorbic Acid (TELLY-C PO)    co-enzyme Q-10 30 MG capsule    cyanocobalamin (VITAMIN B-12) 100 MCG tablet    VITAMIN D PO    azelastine (ASTELIN) 0 1 % nasal spray    diclofenac sodium (VOLTAREN) 1 %    meloxicam (MOBIC) 15 mg tablet    methocarbamol (ROBAXIN) 500 mg tablet    Turmeric (CURCUMIN 95 PO)    Allergies   Allergen Reactions    Cat Hair Extract      Cat dander    Dust Mite Extract      dust    Lactose - Food Allergy      Heavy creams and milk       PHYSICAL EXAM:    Blood pressure 138/90, pulse 97, height 5' 5 5" (1 664 m), weight 98 kg (216 lb)  Body mass index is 35 4 kg/m²  General Appearance: NAD, cooperative, alert  Eyes: Anicteric,  Conjunctiva pink  ENT:  Normocephalic, atraumatic, normal mucosa  Neck:  Supple, symmetrical, trachea midline,   Resp:  Clear to auscultation bilaterally; no rales, rhonchi or wheezing; respirations unlabored   CV:  S1 S2, Regular rate and rhythm; no murmur, rub, or gallop  GI:  Soft, non-tender, non-distended; normal bowel sounds; no masses, no organomegaly   Rectal: Deferred  Musculoskeletal: No cyanosis, clubbing - positive for Trace pedal edema  Normal ROM  Skin:  No jaundice, rashes, or lesions   Heme/Lymph: No palpable cervical lymphadenopathy  Psych: Normal affect, good eye contact  Neuro: No gross deficits, AAOx3    Lab Results:   Lab Results   Component Value Date    WBC 5 83 10/02/2020    HGB 13 4 10/02/2020    HCT 41 1 10/02/2020    MCV 92 10/02/2020     10/02/2020             REVIEW OF SYSTEMS:    CONSTITUTIONAL: Denies any fever, chills, positive for weight gain and fatigue  HEENT: No earache or tinnitus  Denies hearing loss or visual disturbances  CARDIOVASCULAR: No chest pain or palpitations  Positive for pedal edema   RESPIRATORY: Denies any cough, hemoptysis, shortness of breath or dyspnea on exertion  GASTROINTESTINAL: As noted in the History of Present Illness  - feels not well after eating  GENITOURINARY: No problems with urination  Denies any hematuria or dysuria  NEUROLOGIC: No dizziness or vertigo, denies headaches  MUSCULOSKELETAL:  Positive for back pain positive for left hip pain   SKIN: Denies skin rashes or itching     ENDOCRINE: Denies excessive thirst  Denies intolerance to heat or cold  PSYCHOSOCIAL: Denies depression or anxiety  Denies any recent memory loss

## 2022-01-18 NOTE — LETTER
January 18, 2022     Camryn Siegel, [address] Raines  [address] [address]    Patient: Jessy Barnes   YOB: 1965   Date of Visit: 1/18/2022       Dear Dr Stacy Sheth: Thank you for referring Jelly Lancaster to me for evaluation  Below are my notes for this consultation  If you have questions, please do not hesitate to call me  I look forward to following your patient along with you  Sincerely,        Héctor Odell MD        CC: No Recipients  Héctor Odell MD  1/18/2022  2:35 PM  Sign when Signing Visit    [address] [facility] - Outpatient Consultation  Jessy Barnes 61 y o  female MRN: [id number]  Encounter: [id number]    ASSESSMENT AND PLAN:      1  Bloating symptom  --Patient with ongoing problems with abdominal discomfort right upper quadrant and some bloating  Also some back discomfort which is associated  Much worse with fatty food  Has been going on for years  She has had negative ultrasounds in the past the last being in the fall of 2020  Differential diagnosis could include gallbladder dysfunction, gastro paresis, GERD and celiac disease  Will order studies as mentioned  - US abdomen complete; Future  - CBC and differential; Future  - Comprehensive metabolic panel; Future  - Celiac Disease Panel; Future  - NM hepatobiliary w rx; Future    -  Could consider gastro paresis as well  Hold on ordering gastric emptying study  Patient does have some symptoms of early satiety    - will give patient a low FODMAP diet just to see if this may be helpful to in the event studies are not    2  Fatty liver  -- has been diagnosed by ultrasound in the past   Liver function studies have been normal    3  Lactose intolerance  -- patient reports problems with milk so she avoids and problems with dairy product    4  Colon cancer screening  - patient reports negative colonoscopy and also hospital age 47   She would be due again till she was 59    5  Irritable bowel syndrome, unspecified type  -- patient with intermittent spasms  Does have relief of left-sided abdominal pain with a bowel movement at times  Does move her bowels about twice a day  Occasional constipation  Has been told in the past that she has irritable bowel syndrome  - could consider course of probiotics  Antispasmodics may be helpful but patient seems  Reluctant for pharmacologic intervention  Could consider trial of peppermint supplement  or green tea      Followup Appointment:  3 mo   ______________________________________________________________________    Chief Complaint   Patient presents with   Louis Gibbs     thinks its gallbladder       HPI:  Patient comes to the office for evaluation of abdominal discomfort and abdominal bloating  Patient refills she really does not feel well from a digestive point of view  If she has any kind of fatty foods she does not feel well after eating  She does have some increased belching right-sided abdominal discomfort and back discomfort  These seem to be associated with the a fatty meal   She does feel full after eating  Patient has had previous evaluation with gallbladder ultrasonography which has been negative  She has been noted to have fatty liver with normal liver function studies  She does have some symptoms of fatigue  Patient does report that if she drinks any thing like red wine this really kind of sets are offer causes problems  Patient has been diagnosed with irritable bowel syndrome in the past   She will have some crampy spasmodic type abdominal pain  This is relieved with passing gas or having a bowel movement  Patient reports having foul-smelling flatus  To does report intolerance to lactose  She has never been checked for celiac disease  Overall she just feels sluggish and feels as though her digestive system is not functioning properly    She  prefers alternative remedies to medical problems as opposed to pharmacologic intervention        esents ***Historical Information   Past Medical History:   Diagnosis Date    IBS (irritable bowel syndrome)      Past Surgical History:   Procedure Laterality Date    BREAST BIOPSY Left 2013    NEG RESULTS    BREAST SURGERY      biopsy dense mass--negative result---2010     Social History     Substance and Sexual Activity   Alcohol Use Yes     Social History     Substance and Sexual Activity   Drug Use Never     Social History     Tobacco Use   Smoking Status Never Smoker   Smokeless Tobacco Never Used     Family History   Problem Relation Age of Onset    Substance Abuse Father     Alcohol abuse Father     Melanoma Father     Colon polyps Father     Alcohol abuse Sister     Substance Abuse Sister         in remission    Cholelithiasis Sister     Breast cancer Sister     Gallbladder disease Sister     Substance Abuse Brother         active alcoholic    Alcohol abuse Brother     Gallbladder disease Brother     Alcohol abuse Brother     Substance Abuse Brother         in remission    Gallbladder disease Brother     Alcohol abuse Brother     Substance Abuse Brother         in remission    Alcohol abuse Brother     Substance Abuse Brother         in remission    Cholelithiasis Mother     Hypertension Mother     Abdominal aortic aneurysm Mother     Gallbladder disease Mother     No Known Problems Maternal Grandmother     No Known Problems Maternal Grandfather     No Known Problems Paternal Grandmother     No Known Problems Paternal Grandfather     No Known Problems Maternal Aunt     No Known Problems Maternal Aunt     Liver cancer Cousin     Mental illness Neg Hx        Meds/Allergies     Current Outpatient Medications:     Ascorbic Acid (TELLY-C PO)    co-enzyme Q-10 30 MG capsule    cyanocobalamin (VITAMIN B-12) 100 MCG tablet    VITAMIN D PO    azelastine (ASTELIN) 0 1 % nasal spray    diclofenac sodium (VOLTAREN) 1 %    meloxicam (MOBIC) 15 mg tablet    methocarbamol (ROBAXIN) 500 mg tablet    Turmeric (CURCUMIN 95 PO)    Allergies   Allergen Reactions    Cat Hair Extract      Cat dander    Dust Mite Extract      dust    Lactose - Food Allergy      Heavy creams and milk       PHYSICAL EXAM:    Blood pressure 138/90, pulse 97, height 5' 5 5" (1 664 m), weight 98 kg (216 lb)  Body mass index is 35 4 kg/m²  General Appearance: NAD, cooperative, alert  Eyes: Anicteric,  Conjunctiva pink  ENT:  Normocephalic, atraumatic, normal mucosa  Neck:  Supple, symmetrical, trachea midline,   Resp:  Clear to auscultation bilaterally; no rales, rhonchi or wheezing; respirations unlabored   CV:  S1 S2, Regular rate and rhythm; no murmur, rub, or gallop  GI:  Soft, non-tender, non-distended; normal bowel sounds; no masses, no organomegaly   Rectal: Deferred  Musculoskeletal: No cyanosis, clubbing - positive for Trace pedal edema  Normal ROM  Skin:  No jaundice, rashes, or lesions   Heme/Lymph: No palpable cervical lymphadenopathy  Psych: Normal affect, good eye contact  Neuro: No gross deficits, AAOx3    Lab Results:   Lab Results   Component Value Date    WBC 5 83 10/02/2020    HGB 13 4 10/02/2020    HCT 41 1 10/02/2020    MCV 92 10/02/2020     10/02/2020             REVIEW OF SYSTEMS:    CONSTITUTIONAL: Denies any fever, chills, positive for weight gain and fatigue  HEENT: No earache or tinnitus  Denies hearing loss or visual disturbances  CARDIOVASCULAR: No chest pain or palpitations  Positive for pedal edema   RESPIRATORY: Denies any cough, hemoptysis, shortness of breath or dyspnea on exertion  GASTROINTESTINAL: As noted in the History of Present Illness  - feels not well after eating  GENITOURINARY: No problems with urination  Denies any hematuria or dysuria  NEUROLOGIC: No dizziness or vertigo, denies headaches  MUSCULOSKELETAL:  Positive for back pain positive for left hip pain   SKIN: Denies skin rashes or itching     ENDOCRINE: Denies excessive thirst  Denies intolerance to heat or cold  PSYCHOSOCIAL: Denies depression or anxiety  Denies any recent memory loss

## 2022-01-18 NOTE — LETTER
January 18, 2022     [facility], Shanthi Raines  [address] [address]    Patient: Ulysses Cao   YOB: 1965   Date of Visit: 1/18/2022       Dear Dr Erika Galvan: Thank you for referring Ana María Yañez to me for evaluation  Below are my notes for this consultation  If you have questions, please do not hesitate to call me  I look forward to following your patient along with you  Sincerely,        Kaylyn Olmos MD        CC: No Recipients  Kaylyn Olmos MD  1/18/2022  2:35 PM  Incomplete    [facility] - Outpatient Consultation  Ulysses Cao 61 y o  female MRN: [id number]  Encounter: [id number]    ASSESSMENT AND PLAN:      1  Bloating symptom  --Patient with ongoing problems with abdominal discomfort right upper quadrant and some bloating  Also some back discomfort which is associated  Much worse with fatty food  Has been going on for years  She has had negative ultrasounds in the past the last being in the fall of 2020  Differential diagnosis could include gallbladder dysfunction, gastro paresis, GERD and celiac disease  Will order studies as mentioned  - US abdomen complete; Future  - CBC and differential; Future  - Comprehensive metabolic panel; Future  - Celiac Disease Panel; Future  - NM hepatobiliary w rx; Future    -  Could consider gastro paresis as well  Hold on ordering gastric emptying study  Patient does have some symptoms of early satiety    - will give patient a low FODMAP diet just to see if this may be helpful to in the event studies are not    2  Fatty liver  -- has been diagnosed by ultrasound in the past   Liver function studies have been normal    3  Lactose intolerance  -- patient reports problems with milk so she avoids and problems with dairy product    4  Colon cancer screening  - patient reports negative colonoscopy and also hospital age 47  She would be due again till she was 59    5   Irritable bowel syndrome, unspecified type  -- patient with intermittent spasms  Does have relief of left-sided abdominal pain with a bowel movement at times  Does move her bowels about twice a day  Occasional constipation  Has been told in the past that she has irritable bowel syndrome  - could consider course of probiotics  Antispasmodics may be helpful but patient seems  Reluctant for pharmacologic intervention  Could consider trial of peppermint supplement  or green tea      Followup Appointment:  3 mo   ______________________________________________________________________    Chief Complaint   Patient presents with   Elvia Blank     thinks its gallbladder       HPI:  Patient comes to the office for evaluation of abdominal discomfort and abdominal bloating  Patient refills she really does not feel well from a digestive point of view  If she has any kind of fatty foods she does not feel well after eating  She does have some increased belching right-sided abdominal discomfort and back discomfort  These seem to be associated with the a fatty meal   She does feel full after eating  Patient has had previous evaluation with gallbladder ultrasonography which has been negative  She has been noted to have fatty liver with normal liver function studies  She does have some symptoms of fatigue  Patient does report that if she drinks any thing like red wine this really kind of sets are offer causes problems  Patient has been diagnosed with irritable bowel syndrome in the past   She will have some crampy spasmodic type abdominal pain  This is relieved with passing gas or having a bowel movement  Patient reports having foul-smelling flatus  To does report intolerance to lactose  She has never been checked for celiac disease  Overall she just feels sluggish and feels as though her digestive system is not functioning properly    She  prefers alternative remedies to medical problems as opposed to pharmacologic intervention        esents ***Historical Information   Past Medical History:   Diagnosis Date    IBS (irritable bowel syndrome)      Past Surgical History:   Procedure Laterality Date    BREAST BIOPSY Left 2013    NEG RESULTS    BREAST SURGERY      biopsy dense mass--negative result---2010     Social History     Substance and Sexual Activity   Alcohol Use Yes     Social History     Substance and Sexual Activity   Drug Use Never     Social History     Tobacco Use   Smoking Status Never Smoker   Smokeless Tobacco Never Used     Family History   Problem Relation Age of Onset    Substance Abuse Father     Alcohol abuse Father     Melanoma Father     Colon polyps Father     Alcohol abuse Sister     Substance Abuse Sister         in remission    Cholelithiasis Sister     Breast cancer Sister     Gallbladder disease Sister     Substance Abuse Brother         active alcoholic    Alcohol abuse Brother     Gallbladder disease Brother     Alcohol abuse Brother     Substance Abuse Brother         in remission    Gallbladder disease Brother     Alcohol abuse Brother     Substance Abuse Brother         in remission    Alcohol abuse Brother     Substance Abuse Brother         in remission    Cholelithiasis Mother     Hypertension Mother     Abdominal aortic aneurysm Mother     Gallbladder disease Mother     No Known Problems Maternal Grandmother     No Known Problems Maternal Grandfather     No Known Problems Paternal Grandmother     No Known Problems Paternal Grandfather     No Known Problems Maternal Aunt     No Known Problems Maternal Aunt     Liver cancer Cousin     Mental illness Neg Hx        Meds/Allergies     Current Outpatient Medications:     Ascorbic Acid (TELLY-C PO)    co-enzyme Q-10 30 MG capsule    cyanocobalamin (VITAMIN B-12) 100 MCG tablet    VITAMIN D PO    azelastine (ASTELIN) 0 1 % nasal spray    diclofenac sodium (VOLTAREN) 1 %    meloxicam (MOBIC) 15 mg tablet   methocarbamol (ROBAXIN) 500 mg tablet    Turmeric (CURCUMIN 95 PO)    Allergies   Allergen Reactions    Cat Hair Extract      Cat dander    Dust Mite Extract      dust    Lactose - Food Allergy      Heavy creams and milk       PHYSICAL EXAM:    Blood pressure 138/90, pulse 97, height 5' 5 5" (1 664 m), weight 98 kg (216 lb)  Body mass index is 35 4 kg/m²  General Appearance: NAD, cooperative, alert  Eyes: Anicteric,  Conjunctiva pink  ENT:  Normocephalic, atraumatic, normal mucosa  Neck:  Supple, symmetrical, trachea midline,   Resp:  Clear to auscultation bilaterally; no rales, rhonchi or wheezing; respirations unlabored   CV:  S1 S2, Regular rate and rhythm; no murmur, rub, or gallop  GI:  Soft, non-tender, non-distended; normal bowel sounds; no masses, no organomegaly   Rectal: Deferred  Musculoskeletal: No cyanosis, clubbing - positive for Trace pedal edema  Normal ROM  Skin:  No jaundice, rashes, or lesions   Heme/Lymph: No palpable cervical lymphadenopathy  Psych: Normal affect, good eye contact  Neuro: No gross deficits, AAOx3    Lab Results:   Lab Results   Component Value Date    WBC 5 83 10/02/2020    HGB 13 4 10/02/2020    HCT 41 1 10/02/2020    MCV 92 10/02/2020     10/02/2020             REVIEW OF SYSTEMS:    CONSTITUTIONAL: Denies any fever, chills, positive for weight gain and fatigue  HEENT: No earache or tinnitus  Denies hearing loss or visual disturbances  CARDIOVASCULAR: No chest pain or palpitations  Positive for pedal edema   RESPIRATORY: Denies any cough, hemoptysis, shortness of breath or dyspnea on exertion  GASTROINTESTINAL: As noted in the History of Present Illness  - feels not well after eating  GENITOURINARY: No problems with urination  Denies any hematuria or dysuria  NEUROLOGIC: No dizziness or vertigo, denies headaches  MUSCULOSKELETAL:  Positive for back pain positive for left hip pain   SKIN: Denies skin rashes or itching     ENDOCRINE: Denies excessive thirst  Denies intolerance to heat or cold  PSYCHOSOCIAL: Denies depression or anxiety  Denies any recent memory loss  Renée Ryder MD  1/18/2022  1:36 PM  Sign when Signing Visit    [id number] - Outpatient Consultation  Lakeisha Miller 61 y o  female MRN: [id number]  Encounter: [id number]    ASSESSMENT AND PLAN:      1  Bloating symptom  --Patient with ongoing problems with abdominal discomfort right upper quadrant and some bloating  Also some back discomfort which is associated  Much worse with fatty food  Has been going on for years  She has had negative ultrasounds in the past the last being in the fall of 2020  Differential diagnosis could include gallbladder dysfunction, gastro paresis, GERD and celiac disease  Will order studies as mentioned  - US abdomen complete; Future  - CBC and differential; Future  - Comprehensive metabolic panel; Future  - Celiac Disease Panel; Future  - NM hepatobiliary w rx; Future    -  Could consider gastro paresis as well  Hold on ordering gastric emptying study  Patient does have some symptoms of early satiety    - will give patient a low FODMAP diet just to see if this may be helpful to in the event studies are not    2  Fatty liver  -- has been diagnosed by ultrasound in the past   Liver function studies have been normal    3  Lactose intolerance  -- patient reports problems with milk so she avoids and problems with dairy product    4  Colon cancer screening  - patient reports negative colonoscopy and also hospital age 47  She would be due again till she was 59    5  Irritable bowel syndrome, unspecified type  -- patient with intermittent spasms  Does have relief of left-sided abdominal pain with a bowel movement at times  Does move her bowels about twice a day  Occasional constipation  Has been told in the past that she has irritable bowel syndrome  - could consider course of probiotics    Antispasmodics may be helpful but patient seems  Reluctant for pharmacologic intervention    Could consider trial of peppermint supplement  or green tea      Followup Appointment:  3 mo   ______________________________________________________________________    Chief Complaint   Patient presents with   Trell Fong     thinks its gallbladder       HPI:   Angelica Sneed is a 61y o  year old female who presents ***    Historical Information   Past Medical History:   Diagnosis Date    IBS (irritable bowel syndrome)      Past Surgical History:   Procedure Laterality Date    BREAST BIOPSY Left 2013    NEG RESULTS    BREAST SURGERY      biopsy dense mass--negative result---2010     Social History     Substance and Sexual Activity   Alcohol Use Yes     Social History     Substance and Sexual Activity   Drug Use Never     Social History     Tobacco Use   Smoking Status Never Smoker   Smokeless Tobacco Never Used     Family History   Problem Relation Age of Onset    Substance Abuse Father     Alcohol abuse Father     Melanoma Father     Colon polyps Father     Alcohol abuse Sister     Substance Abuse Sister         in remission    Cholelithiasis Sister     Breast cancer Sister     Gallbladder disease Sister     Substance Abuse Brother         active alcoholic    Alcohol abuse Brother     Gallbladder disease Brother     Alcohol abuse Brother     Substance Abuse Brother         in remission    Gallbladder disease Brother     Alcohol abuse Brother     Substance Abuse Brother         in remission    Alcohol abuse Brother     Substance Abuse Brother         in remission    Cholelithiasis Mother     Hypertension Mother     Abdominal aortic aneurysm Mother     Gallbladder disease Mother     No Known Problems Maternal Grandmother     No Known Problems Maternal Grandfather     No Known Problems Paternal Grandmother     No Known Problems Paternal Grandfather     No Known Problems Maternal Aunt     No Known Problems Maternal Aunt     Liver cancer Cousin     Mental illness Neg Hx        Meds/Allergies     Current Outpatient Medications:     Ascorbic Acid (TELLY-C PO)    co-enzyme Q-10 30 MG capsule    cyanocobalamin (VITAMIN B-12) 100 MCG tablet    VITAMIN D PO    azelastine (ASTELIN) 0 1 % nasal spray    diclofenac sodium (VOLTAREN) 1 %    meloxicam (MOBIC) 15 mg tablet    methocarbamol (ROBAXIN) 500 mg tablet    Turmeric (CURCUMIN 95 PO)    Allergies   Allergen Reactions    Cat Hair Extract      Cat dander    Dust Mite Extract      dust    Lactose - Food Allergy      Heavy creams and milk       PHYSICAL EXAM:    Blood pressure 138/90, pulse 97, height 5' 5 5" (1 664 m), weight 98 kg (216 lb)  Body mass index is 35 4 kg/m²  General Appearance: NAD, cooperative, alert  Eyes: Anicteric, PERRLA, EOMI  ENT:  Normocephalic, atraumatic, normal mucosa  Neck:  Supple, symmetrical, trachea midline,   Resp:  Clear to auscultation bilaterally; no rales, rhonchi or wheezing; respirations unlabored   CV:  S1 S2, Regular rate and rhythm; no murmur, rub, or gallop  GI:  Soft, non-tender, non-distended; normal bowel sounds; no masses, no organomegaly   Rectal: Deferred  Musculoskeletal: No cyanosis, clubbing or edema  Normal ROM  Skin:  No jaundice, rashes, or lesions   Heme/Lymph: No palpable cervical lymphadenopathy  Psych: Normal affect, good eye contact  Neuro: No gross deficits, AAOx3    Lab Results:   Lab Results   Component Value Date    WBC 5 83 10/02/2020    HGB 13 4 10/02/2020    HCT 41 1 10/02/2020    MCV 92 10/02/2020     10/02/2020         Radiology Results:   No results found  REVIEW OF SYSTEMS:    CONSTITUTIONAL: Denies any fever, chills, positive for weight gain and fatigue  HEENT: No earache or tinnitus  Denies hearing loss or visual disturbances  CARDIOVASCULAR: No chest pain or palpitations  RESPIRATORY: Denies any cough, hemoptysis, shortness of breath or dyspnea on exertion    GASTROINTESTINAL: As noted in the History of Present Illness  - feels not well after eating  GENITOURINARY: No problems with urination  Denies any hematuria or dysuria  NEUROLOGIC: No dizziness or vertigo, denies headaches  MUSCULOSKELETAL:  Positive for back pain positive for left hip pain   SKIN: Denies skin rashes or itching  ENDOCRINE: Denies excessive thirst  Denies intolerance to heat or cold  PSYCHOSOCIAL: Denies depression or anxiety  Denies any recent memory loss

## 2022-02-02 ENCOUNTER — HOSPITAL ENCOUNTER (OUTPATIENT)
Dept: NUCLEAR MEDICINE | Facility: HOSPITAL | Age: 57
Discharge: HOME | End: 2022-02-02
Attending: INTERNAL MEDICINE
Payer: COMMERCIAL

## 2022-02-02 DIAGNOSIS — R14.0 BLOATING SYMPTOM: ICD-10-CM

## 2022-02-02 PROCEDURE — 78227 HEPATOBIL SYST IMAGE W/DRUG: CPT

## 2022-02-02 PROCEDURE — A9537 TC99M MEBROFENIN: HCPCS

## 2022-02-02 RX ADMIN — SINCALIDE 2 MCG: 5 INJECTION, POWDER, LYOPHILIZED, FOR SOLUTION INTRAVENOUS at 11:05

## 2025-02-04 ENCOUNTER — OFFICE VISIT (OUTPATIENT)
Dept: FAMILY MEDICINE CLINIC | Facility: CLINIC | Age: 60
End: 2025-02-04
Payer: COMMERCIAL

## 2025-02-04 VITALS
SYSTOLIC BLOOD PRESSURE: 128 MMHG | OXYGEN SATURATION: 97 % | HEIGHT: 65 IN | WEIGHT: 220 LBS | BODY MASS INDEX: 36.65 KG/M2 | HEART RATE: 81 BPM | DIASTOLIC BLOOD PRESSURE: 78 MMHG | TEMPERATURE: 97.6 F

## 2025-02-04 DIAGNOSIS — Z12.31 ENCOUNTER FOR SCREENING MAMMOGRAM FOR BREAST CANCER: ICD-10-CM

## 2025-02-04 DIAGNOSIS — R19.6 HALITOSIS: ICD-10-CM

## 2025-02-04 DIAGNOSIS — Z13.228 SCREENING FOR ENDOCRINE, METABOLIC AND IMMUNITY DISORDER: ICD-10-CM

## 2025-02-04 DIAGNOSIS — Z13.220 SCREENING FOR LIPID DISORDERS: ICD-10-CM

## 2025-02-04 DIAGNOSIS — R73.9 BLOOD GLUCOSE ELEVATED: ICD-10-CM

## 2025-02-04 DIAGNOSIS — Z13.0 SCREENING FOR DEFICIENCY ANEMIA: ICD-10-CM

## 2025-02-04 DIAGNOSIS — G89.29 CHRONIC RIGHT-SIDED LOW BACK PAIN WITHOUT SCIATICA: ICD-10-CM

## 2025-02-04 DIAGNOSIS — Z00.00 ANNUAL PHYSICAL EXAM: Primary | ICD-10-CM

## 2025-02-04 DIAGNOSIS — Z13.1 SCREENING FOR DIABETES MELLITUS (DM): ICD-10-CM

## 2025-02-04 DIAGNOSIS — Z13.0 SCREENING FOR ENDOCRINE, METABOLIC AND IMMUNITY DISORDER: ICD-10-CM

## 2025-02-04 DIAGNOSIS — Z13.29 SCREENING FOR ENDOCRINE, METABOLIC AND IMMUNITY DISORDER: ICD-10-CM

## 2025-02-04 DIAGNOSIS — M54.50 CHRONIC RIGHT-SIDED LOW BACK PAIN WITHOUT SCIATICA: ICD-10-CM

## 2025-02-04 LAB — SL AMB POCT HEMOGLOBIN AIC: 5.5 (ref ?–6.5)

## 2025-02-04 PROCEDURE — 83036 HEMOGLOBIN GLYCOSYLATED A1C: CPT | Performed by: FAMILY MEDICINE

## 2025-02-04 PROCEDURE — 99396 PREV VISIT EST AGE 40-64: CPT | Performed by: FAMILY MEDICINE

## 2025-02-04 PROCEDURE — 99213 OFFICE O/P EST LOW 20 MIN: CPT | Performed by: FAMILY MEDICINE

## 2025-02-04 NOTE — PATIENT INSTRUCTIONS
"[facility] at [facility]   Schedule mammogram   Schedule pap  Probiotic; Align, culturelle, florastor  Continue gi evaluation  Schedule eye exam  Patient Education     Routine physical for adults   The Basics   Written by the doctors and editors at [facility]   What is a physical? -- A physical is a routine visit, or \"check-up,\" with your doctor. You might also hear it called a \"wellness visit\" or \"preventive visit.\"  During each visit, the doctor will:   Ask about your physical and mental health   Ask about your habits, behaviors, and lifestyle   Do an exam   Give you vaccines if needed   Talk to you about any medicines you take   Give advice about your health   Answer your questions  Getting regular check-ups is an important part of taking care of your health. It can help your doctor find and treat any problems you have. But it's also important for preventing health problems.  A routine physical is different from a \"sick visit.\" A sick visit is when you see a doctor because of a health concern or problem. Since physicals are scheduled ahead of time, you can think about what you want to ask the doctor.  How often should I get a physical? -- It depends on your age and health. In general, for people age 21 years and older:   If you are younger than 50 years, you might be able to get a physical every 3 years.   If you are 50 years or older, your doctor might recommend a physical every year.  If you have an ongoing health condition, like diabetes or high blood pressure, your doctor will probably want to see you more often.  What happens during a physical? -- In general, each visit will include:   Physical exam - The doctor or nurse will check your height, weight, heart rate, and blood pressure. They will also look at your eyes and ears. They will ask about how you are feeling and whether you have any symptoms that bother you.   Medicines - It's a good idea to bring a list of all the medicines you take to each doctor " "visit. Your doctor will talk to you about your medicines and answer any questions. Tell them if you are having any side effects that bother you. You should also tell them if you are having trouble paying for any of your medicines.   Habits and behaviors - This includes:   Your diet   Your exercise habits   Whether you smoke, drink alcohol, or use drugs   Whether you are sexually active   Whether you feel safe at home  Your doctor will talk to you about things you can do to improve your health and lower your risk of health problems. They will also offer help and support. For example, if you want to quit smoking, they can give you advice and might prescribe medicines. If you want to improve your diet or get more physical activity, they can help you with this, too.   Lab tests, if needed - The tests you get will depend on your age and situation. For example, your doctor might want to check your:   Cholesterol   Blood sugar   Iron level   Vaccines - The recommended vaccines will depend on your age, health, and what vaccines you already had. Vaccines are very important because they can prevent certain serious or deadly infections.   Discussion of screening - \"Screening\" means checking for diseases or other health problems before they cause symptoms. Your doctor can recommend screening based on your age, risk, and preferences. This might include tests to check for:   Cancer, such as breast, prostate, cervical, ovarian, colorectal, prostate, lung, or skin cancer   Sexually transmitted infections, such as chlamydia and gonorrhea   Mental health conditions like depression and anxiety  Your doctor will talk to you about the different types of screening tests. They can help you decide which screenings to have. They can also explain what the results might mean.   Answering questions - The physical is a good time to ask the doctor or nurse questions about your health. If needed, they can refer you to other doctors or " specialists, too.  Adults older than 65 years often need other care, too. As you get older, your doctor will talk to you about:   How to prevent falling at home   Hearing or vision tests   Memory testing   How to take your medicines safely   Making sure that you have the help and support you need at home  All topics are updated as new evidence becomes available and our peer review process is complete.  This topic retrieved from [employer] on: May 02, 2024.  Topic 945743 Version 1.0  Release: 32.4.3 - C32.122  © 2024 UpToDate, Inc. and/or its affiliates. All rights reserved.  Consumer Information Use and Disclaimer   Disclaimer: This generalized information is a limited summary of diagnosis, treatment, and/or medication information. It is not meant to be comprehensive and should be used as a tool to help the user understand and/or assess potential diagnostic and treatment options. It does NOT include all information about conditions, treatments, medications, side effects, or risks that may apply to a specific patient. It is not intended to be medical advice or a substitute for the medical advice, diagnosis, or treatment of a health care provider based on the health care provider's examination and assessment of a patient's specific and unique circumstances. Patients must speak with a health care provider for complete information about their health, medical questions, and treatment options, including any risks or benefits regarding use of medications. This information does not endorse any treatments or medications as safe, effective, or approved for treating a specific patient. UpToDate, Inc. and its affiliates disclaim any warranty or liability relating to this information or the use thereof.The use of this information is governed by the Terms of Use, available at https://www.wolterskluwer.com/en/know/clinical-effectiveness-terms. 2024© UpToDate, Inc. and its affiliates and/or licensors. All rights reserved.  Copyright  "  © 2024 UpToDate, Inc. and/or its affiliates. All rights reserved.    Patient Education     Routine physical for adults   The Basics   Written by the doctors and editors at [employer]   What is a physical? -- A physical is a routine visit, or \"check-up,\" with your doctor. You might also hear it called a \"wellness visit\" or \"preventive visit.\"  During each visit, the doctor will:   Ask about your physical and mental health   Ask about your habits, behaviors, and lifestyle   Do an exam   Give you vaccines if needed   Talk to you about any medicines you take   Give advice about your health   Answer your questions  Getting regular check-ups is an important part of taking care of your health. It can help your doctor find and treat any problems you have. But it's also important for preventing health problems.  A routine physical is different from a \"sick visit.\" A sick visit is when you see a doctor because of a health concern or problem. Since physicals are scheduled ahead of time, you can think about what you want to ask the doctor.  How often should I get a physical? -- It depends on your age and health. In general, for people age 21 years and older:   If you are younger than 50 years, you might be able to get a physical every 3 years.   If you are 50 years or older, your doctor might recommend a physical every year.  If you have an ongoing health condition, like diabetes or high blood pressure, your doctor will probably want to see you more often.  What happens during a physical? -- In general, each visit will include:   Physical exam - The doctor or nurse will check your height, weight, heart rate, and blood pressure. They will also look at your eyes and ears. They will ask about how you are feeling and whether you have any symptoms that bother you.   Medicines - It's a good idea to bring a list of all the medicines you take to each doctor visit. Your doctor will talk to you about your medicines and answer any " "questions. Tell them if you are having any side effects that bother you. You should also tell them if you are having trouble paying for any of your medicines.   Habits and behaviors - This includes:   Your diet   Your exercise habits   Whether you smoke, drink alcohol, or use drugs   Whether you are sexually active   Whether you feel safe at home  Your doctor will talk to you about things you can do to improve your health and lower your risk of health problems. They will also offer help and support. For example, if you want to quit smoking, they can give you advice and might prescribe medicines. If you want to improve your diet or get more physical activity, they can help you with this, too.   Lab tests, if needed - The tests you get will depend on your age and situation. For example, your doctor might want to check your:   Cholesterol   Blood sugar   Iron level   Vaccines - The recommended vaccines will depend on your age, health, and what vaccines you already had. Vaccines are very important because they can prevent certain serious or deadly infections.   Discussion of screening - \"Screening\" means checking for diseases or other health problems before they cause symptoms. Your doctor can recommend screening based on your age, risk, and preferences. This might include tests to check for:   Cancer, such as breast, prostate, cervical, ovarian, colorectal, prostate, lung, or skin cancer   Sexually transmitted infections, such as chlamydia and gonorrhea   Mental health conditions like depression and anxiety  Your doctor will talk to you about the different types of screening tests. They can help you decide which screenings to have. They can also explain what the results might mean.   Answering questions - The physical is a good time to ask the doctor or nurse questions about your health. If needed, they can refer you to other doctors or specialists, too.  Adults older than 65 years often need other care, too. As you " get older, your doctor will talk to you about:   How to prevent falling at home   Hearing or vision tests   Memory testing   How to take your medicines safely   Making sure that you have the help and support you need at home  All topics are updated as new evidence becomes available and our peer review process is complete.  This topic retrieved from [employer] on: May 02, 2024.  Topic 757188 Version 1.0  Release: 32.4.3 - C32.122  © 2024 UpToDate, Inc. and/or its affiliates. All rights reserved.  Consumer Information Use and Disclaimer   Disclaimer: This generalized information is a limited summary of diagnosis, treatment, and/or medication information. It is not meant to be comprehensive and should be used as a tool to help the user understand and/or assess potential diagnostic and treatment options. It does NOT include all information about conditions, treatments, medications, side effects, or risks that may apply to a specific patient. It is not intended to be medical advice or a substitute for the medical advice, diagnosis, or treatment of a health care provider based on the health care provider's examination and assessment of a patient's specific and unique circumstances. Patients must speak with a health care provider for complete information about their health, medical questions, and treatment options, including any risks or benefits regarding use of medications. This information does not endorse any treatments or medications as safe, effective, or approved for treating a specific patient. UpToDate, Inc. and its affiliates disclaim any warranty or liability relating to this information or the use thereof.The use of this information is governed by the Terms of Use, available at https://www.wolterskluwer.com/en/know/clinical-effectiveness-terms. 2024© UpToDate, Inc. and its affiliates and/or licensors. All rights reserved.  Copyright   © 2024 UpToDate, Inc. and/or its affiliates. All rights reserved.

## 2025-02-04 NOTE — PROGRESS NOTES
Adult Annual Physical  Name: Katia Grey      : 1965      MRN: [id number]  Encounter Provider: Emely Galan DO  Encounter Date: 2025   Encounter department: [facility]    Assessment & Plan  Annual physical exam         Chronic right-sided low back pain without sciatica  Xray lumbar spine  - degenerative changes with osteophytes- referred to pain management        Blood glucose elevated  Hba1c in office today 5.5 today, family history of dm type 2 - brother.   Orders:    Comprehensive metabolic panel; Future    POCT hemoglobin A1c    Body mass index (BMI) 35.0-35.9, adult  Discussed weight loss       Encounter for screening mammogram for breast cancer  Schedule mammogram  Orders:    Mammo screening bilateral w 3d and cad; Future    Screening for deficiency anemia    Orders:    CBC and differential; Future    Screening for diabetes mellitus (DM)    Orders:    Comprehensive metabolic panel; Future    Screening for endocrine, metabolic and immunity disorder    Orders:    Comprehensive metabolic panel; Future    TSH, 3rd generation with Free T4 reflex; Future    Screening for lipid disorders    Orders:    Lipid Panel with Direct LDL reflex    Halitosis  Pt undergoing gi evaluation. Seen by dentist and brushing tongue. Pt will also try probiotic while undergoing gi evaluation         Immunizations and preventive care screenings were discussed with patient today. Appropriate education was printed on patient's after visit summary.    Counseling:  Dental Health: discussed importance of regular tooth brushing, flossing, and dental visits.  Exercise: the importance of regular exercise/physical activity was discussed. Recommend exercise 3-5 times per week for at least 30 minutes.       Depression Screening and Follow-up Plan: Patient was screened for depression during today's encounter. They screened negative with a PHQ-2 score of 0.        History of Present Illness     Adult Annual  "Physical:  Patient presents for annual physical. Pt is here for a physical today - her initial visit in our office.   Family history : Brother with type 2 DM, Mom hypertension,   Noticed changes in the vision   Swelling around eyelids, left side is worse.   Has Dry skin   Pt due for blood work, pap, mammogram, colon cancer screen.     Depression Screening:  - PHQ-2 Score: 0    Review of Systems   Constitutional: Negative.  Negative for fatigue and fever.   HENT: Negative.     Eyes:  Positive for visual disturbance.        Eyelid swelling   Respiratory: Negative.  Negative for cough.    Cardiovascular: Negative.    Gastrointestinal: Negative.    Endocrine: Negative.    Genitourinary: Negative.    Musculoskeletal:  Positive for back pain.   Skin: Negative.    Allergic/Immunologic: Negative.    Neurological: Negative.    Psychiatric/Behavioral: Negative.       Medical History Reviewed by provider this encounter:  Tobacco  Allergies  Meds  Problems  Med Hx  Surg Hx  Fam Hx     .  Current Outpatient Medications on File Prior to Visit   Medication Sig Dispense Refill    meloxicam (MOBIC) 15 mg tablet Take 1 tablet (15 mg total) by mouth daily 30 tablet 2     No current facility-administered medications on file prior to visit.      Social History     Tobacco Use    Smoking status: Never    Smokeless tobacco: Never   Vaping Use    Vaping status: Never Used   Substance and Sexual Activity    Alcohol use: Yes    Drug use: Never    Sexual activity: Not on file       Objective   /78 (BP Location: Left arm, Patient Position: Sitting, Cuff Size: Large)   Pulse 81   Temp 97.6 °F (36.4 °C) (Tympanic)   Ht 5' 5.25\" (1.657 m)   Wt 99.8 kg (220 lb)   SpO2 97%   BMI 36.33 kg/m²     Physical Exam  Vitals and nursing note reviewed.   Constitutional:       Appearance: She is well-developed. She is obese.   HENT:      Head: Normocephalic and atraumatic.      Right Ear: External ear normal.      Left Ear: External ear " normal.      Nose: Nose normal.   Eyes:      Conjunctiva/sclera: Conjunctivae normal.      Pupils: Pupils are equal, round, and reactive to light.   Cardiovascular:      Rate and Rhythm: Normal rate and regular rhythm.      Heart sounds: Normal heart sounds.   Pulmonary:      Effort: Pulmonary effort is normal.      Breath sounds: Normal breath sounds.   Abdominal:      General: Bowel sounds are normal.      Palpations: Abdomen is soft.   Musculoskeletal:         General: Normal range of motion.      Cervical back: Normal range of motion and neck supple.   Skin:     General: Skin is warm and dry.   Neurological:      Mental Status: She is alert and oriented to person, place, and time.   Psychiatric:         Behavior: Behavior normal.         Thought Content: Thought content normal.         Judgment: Judgment normal.

## 2025-02-04 NOTE — ASSESSMENT & PLAN NOTE
Hba1c in office today 5.5 today, family history of dm type 2 - brother.   Orders:    Comprehensive metabolic panel; Future    POCT hemoglobin A1c

## 2025-02-09 PROBLEM — R19.6 HALITOSIS: Status: ACTIVE | Noted: 2025-02-09

## 2025-02-09 NOTE — ASSESSMENT & PLAN NOTE
Pt undergoing gi evaluation. Seen by dentist and brushing tongue. Pt will also try probiotic while undergoing gi evaluation

## 2025-02-10 ENCOUNTER — TELEPHONE (OUTPATIENT)
Dept: FAMILY MEDICINE CLINIC | Facility: CLINIC | Age: 60
End: 2025-02-10

## 2025-02-10 NOTE — TELEPHONE ENCOUNTER
----- Message from Emely Galan DO sent at 2/9/2025 10:34 AM EST -----  Regarding: colonoscopy  Colonoscopy in EHR 2015 with 10 year follow up is in chart. Pt care gap needs to be updated

## 2025-02-28 LAB
ALBUMIN SERPL-MCNC: 4.4 G/DL (ref 3.6–5.1)
ALBUMIN/GLOB SERPL: 1.8 (CALC) (ref 1–2.5)
ALP SERPL-CCNC: 104 U/L (ref 37–153)
ALT SERPL-CCNC: 17 U/L (ref 6–29)
AST SERPL-CCNC: 18 U/L (ref 10–35)
BASOPHILS # BLD AUTO: 0 CELLS/UL (ref 0–200)
BASOPHILS NFR BLD AUTO: 0 %
BILIRUB SERPL-MCNC: 0.8 MG/DL (ref 0.2–1.2)
BUN SERPL-MCNC: 13 MG/DL (ref 7–25)
BUN/CREAT SERPL: NORMAL (CALC) (ref 6–22)
CALCIUM SERPL-MCNC: 9.2 MG/DL (ref 8.6–10.4)
CHLORIDE SERPL-SCNC: 106 MMOL/L (ref 98–110)
CHOLEST SERPL-MCNC: 231 MG/DL
CHOLEST/HDLC SERPL: 2.9 (CALC)
CO2 SERPL-SCNC: 29 MMOL/L (ref 20–32)
CREAT SERPL-MCNC: 0.85 MG/DL (ref 0.5–1.05)
EOSINOPHIL # BLD AUTO: 61 CELLS/UL (ref 15–500)
EOSINOPHIL NFR BLD AUTO: 1.1 %
ERYTHROCYTE [DISTWIDTH] IN BLOOD BY AUTOMATED COUNT: 12.8 % (ref 11–15)
GFR/BSA.PRED SERPLBLD CYS-BASED-ARV: 78 ML/MIN/1.73M2
GLOBULIN SER CALC-MCNC: 2.5 G/DL (CALC) (ref 1.9–3.7)
GLUCOSE SERPL-MCNC: 97 MG/DL (ref 65–99)
HCT VFR BLD AUTO: 43.2 % (ref 35–45)
HDLC SERPL-MCNC: 80 MG/DL
HGB BLD-MCNC: 13.9 G/DL (ref 11.7–15.5)
LDLC SERPL CALC-MCNC: 130 MG/DL (CALC)
LYMPHOCYTES # BLD AUTO: 1001 CELLS/UL (ref 850–3900)
LYMPHOCYTES NFR BLD AUTO: 18.2 %
MCH RBC QN AUTO: 28.7 PG (ref 27–33)
MCHC RBC AUTO-ENTMCNC: 32.2 G/DL (ref 32–36)
MCV RBC AUTO: 89.1 FL (ref 80–100)
MONOCYTES # BLD AUTO: 534 CELLS/UL (ref 200–950)
MONOCYTES NFR BLD AUTO: 9.7 %
NEUTROPHILS # BLD AUTO: 3905 CELLS/UL (ref 1500–7800)
NEUTROPHILS NFR BLD AUTO: 71 %
NONHDLC SERPL-MCNC: 151 MG/DL (CALC)
PLATELET # BLD AUTO: 202 THOUSAND/UL (ref 140–400)
PMV BLD REES-ECKER: 10.1 FL (ref 7.5–12.5)
POTASSIUM SERPL-SCNC: 4.6 MMOL/L (ref 3.5–5.3)
PROT SERPL-MCNC: 6.9 G/DL (ref 6.1–8.1)
RBC # BLD AUTO: 4.85 MILLION/UL (ref 3.8–5.1)
SODIUM SERPL-SCNC: 141 MMOL/L (ref 135–146)
TRIGL SERPL-MCNC: 103 MG/DL
TSH SERPL-ACNC: 2.04 MIU/L (ref 0.4–4.5)
WBC # BLD AUTO: 5.5 THOUSAND/UL (ref 3.8–10.8)

## 2025-03-06 ENCOUNTER — TELEPHONE (OUTPATIENT)
Age: 60
End: 2025-03-06

## 2025-03-07 ENCOUNTER — RESULTS FOLLOW-UP (OUTPATIENT)
Dept: FAMILY MEDICINE CLINIC | Facility: CLINIC | Age: 60
End: 2025-03-07

## 2025-05-11 ENCOUNTER — HOSPITAL ENCOUNTER (EMERGENCY)
Facility: HOSPITAL | Age: 60
Discharge: HOME | End: 2025-05-11
Attending: EMERGENCY MEDICINE
Payer: COMMERCIAL

## 2025-05-11 ENCOUNTER — APPOINTMENT (OUTPATIENT)
Dept: RADIOLOGY | Facility: HOSPITAL | Age: 60
End: 2025-05-11
Payer: COMMERCIAL

## 2025-05-11 VITALS
DIASTOLIC BLOOD PRESSURE: 67 MMHG | WEIGHT: 210 LBS | HEART RATE: 73 BPM | SYSTOLIC BLOOD PRESSURE: 154 MMHG | BODY MASS INDEX: 34.99 KG/M2 | RESPIRATION RATE: 18 BRPM | TEMPERATURE: 97.7 F | OXYGEN SATURATION: 98 % | HEIGHT: 65 IN

## 2025-05-11 DIAGNOSIS — T14.8XXA CRUSH INJURY: Primary | ICD-10-CM

## 2025-05-11 DIAGNOSIS — S60.10XA SUBUNGUAL HEMATOMA OF FINGER, INITIAL ENCOUNTER: ICD-10-CM

## 2025-05-11 DIAGNOSIS — S62.639A CLOSED FRACTURE OF TUFT OF DISTAL PHALANX OF FINGER: ICD-10-CM

## 2025-05-11 PROCEDURE — 90471 IMMUNIZATION ADMIN: CPT

## 2025-05-11 PROCEDURE — 73130 X-RAY EXAM OF HAND: CPT

## 2025-05-11 PROCEDURE — 90715 TDAP VACCINE 7 YRS/> IM: CPT

## 2025-05-11 PROCEDURE — 11740 EVACUATION SUBUNGUAL HMTMA: CPT

## 2025-05-11 PROCEDURE — 99283 EMERGENCY DEPT VISIT LOW MDM: CPT

## 2025-05-11 PROCEDURE — 99284 EMERGENCY DEPT VISIT MOD MDM: CPT

## 2025-05-11 RX ADMIN — TETANUS TOXOID, REDUCED DIPHTHERIA TOXOID AND ACELLULAR PERTUSSIS VACCINE, ADSORBED 0.5 ML: 5; 2.5; 8; 8; 2.5 SUSPENSION INTRAMUSCULAR at 15:48

## 2025-05-11 NOTE — ED PROVIDER NOTES
Time reflects when diagnosis was documented in both MDM as applicable and the Disposition within this note       Time User Action Codes Description Comment    5/11/2025  3:30 PM Prachi Lou Add [T14.8XXA] Crush injury     5/11/2025  3:30 PM Prachi Lou Add [S60.10XA] Subungual hematoma of finger, initial encounter     5/11/2025  3:31 PM Prachi Lou Add [S62.639A] Closed fracture of tuft of distal phalanx of finger           ED Disposition       ED Disposition   Discharge    Condition   Stable    Date/Time   Sun May 11, 2025  3:30 PM    Comment   Katia Grey discharge to home/self care.                   Assessment & Plan       Medical Decision Making  The patient presents with a crush injury to the left fourth finger sustained yesterday. Differential diagnosis includes, but is not limited to, distal phalanx fracture, subungual hematoma, nailbed injury, etc.    Hematoma was evacuated via trephination with good relief of pain. Finger cage placed. Tetanus updated. Patient advised on wound care, signs of infection and strict return precautions. Orthopedic referral was placed. Patient discharged in no acute distress and neurovascularly intact.    Amount and/or Complexity of Data Reviewed  Radiology: independent interpretation performed.    Risk  Prescription drug management.             Medications   tetanus-diphtheria-acellular pertussis (BOOSTRIX) IM injection 0.5 mL (0.5 mL Intramuscular Given 5/11/25 1548)       ED Risk Strat Scores                    No data recorded        SBIRT 22yo+      Flowsheet Row Most Recent Value   Initial Alcohol Screen: US AUDIT-C     1. How often do you have a drink containing alcohol? 0 Filed at: 05/11/2025 1423   2. How many drinks containing alcohol do you have on a typical day you are drinking?  0 Filed at: 05/11/2025 1423   3a. Male UNDER 65: How often do you have five or more drinks on one occasion? 0 Filed at: 05/11/2025 1423   3b. FEMALE Any Age, or MALE 65+: How  often do you have 4 or more drinks on one occassion? 0 Filed at: 05/11/2025 1423   Audit-C Score 0 Filed at: 05/11/2025 1423   JEANNE: How many times in the past year have you...    Used an illegal drug or used a prescription medication for non-medical reasons? Never Filed at: 05/11/2025 1423                            History of Present Illness       Chief Complaint   Patient presents with    Finger Injury     Pt hit the tip of her finger with a . Pt reports accident happened yesterday       Past Medical History:   Diagnosis Date    IBS (irritable bowel syndrome)       Past Surgical History:   Procedure Laterality Date    BREAST BIOPSY Left 2013    NEG RESULTS    BREAST SURGERY      biopsy dense mass--negative result---2010      Family History   Problem Relation Age of Onset    Cholelithiasis Mother     Hypertension Mother     Abdominal aortic aneurysm Mother     Gallbladder disease Mother     Substance Abuse Father     Alcohol abuse Father     Melanoma Father     Colon polyps Father     Alcohol abuse Sister     Substance Abuse Sister         in remission    Cholelithiasis Sister     Breast cancer Sister     Gallbladder disease Sister     Substance Abuse Brother         active alcoholic    Alcohol abuse Brother     Gallbladder disease Brother     Stroke Brother     Alcohol abuse Brother     Substance Abuse Brother         in remission    Gallbladder disease Brother     Alcohol abuse Brother     Substance Abuse Brother         in remission    Alcohol abuse Brother     Substance Abuse Brother         in remission    No Known Problems Maternal Grandmother     No Known Problems Maternal Grandfather     No Known Problems Paternal Grandmother     No Known Problems Paternal Grandfather     No Known Problems Maternal Aunt     No Known Problems Maternal Aunt     Liver cancer Cousin     Mental illness Neg Hx       Social History     Tobacco Use    Smoking status: Never    Smokeless tobacco: Never   Vaping Use    Vaping  status: Never Used   Substance Use Topics    Alcohol use: Yes    Drug use: Never      E-Cigarette/Vaping    E-Cigarette Use Never User       E-Cigarette/Vaping Substances    Nicotine No     THC No     CBD No     Flavoring No     Other No     Unknown No       I have reviewed and agree with the history as documented.     The patient is a 59-year-old female who is presenting to the emergency department for evaluation of right fourth finger crush injury sustained yesterday. Patient reports that her finger got caught between two stones.  She reports pain at the site of injury, swelling and bruising. She denies drainage. She reports is able to perform range of motion however with discomfort.          Review of Systems   Constitutional:  Negative for chills and fever.   Gastrointestinal:  Negative for vomiting.   Musculoskeletal:  Positive for arthralgias and myalgias. Negative for back pain.   Skin:  Positive for color change. Negative for rash.   Neurological:  Negative for tremors, weakness and numbness.   All other systems reviewed and are negative.          Objective       ED Triage Vitals [05/11/25 1424]   Temperature Pulse Blood Pressure Respirations SpO2 Patient Position - Orthostatic VS   97.7 °F (36.5 °C) 73 154/67 18 98 % Sitting      Temp Source Heart Rate Source BP Location FiO2 (%) Pain Score    Temporal Monitor Left arm -- 7      Vitals      Date and Time Temp Pulse SpO2 Resp BP Pain Score FACES Pain Rating User   05/11/25 1424 97.7 °F (36.5 °C) 73 98 % 18 154/67 7 -- CM            Physical Exam  Vitals and nursing note reviewed.   Constitutional:       General: She is not in acute distress.     Appearance: Normal appearance. She is well-developed. She is not ill-appearing.   HENT:      Head: Normocephalic and atraumatic.   Eyes:      Conjunctiva/sclera: Conjunctivae normal.   Pulmonary:      Effort: Pulmonary effort is normal.   Musculoskeletal:         General: Swelling and tenderness present. Normal range  of motion.      Cervical back: Neck supple.      Comments: Swelling to the left fourth digit with associated tenderness and bruising and subungual hematoma.  Patient able to perform range of motion however it does report discomfort.   Skin:     General: Skin is warm and dry.      Capillary Refill: Capillary refill takes less than 2 seconds.      Findings: Bruising present.      Comments: See below, patient verbalized permission for media.   Neurological:      Mental Status: She is alert.      Comments: Sensation and motor intact.  Neurovascularly intact.   Psychiatric:         Mood and Affect: Mood normal.               Results Reviewed       None            XR hand 3+ vw right   ED Interpretation by Prachi Lou PA-C (05/11 1512)   4th Distal phalanx fracture. Formal radiology reading pending.          Procedures    Procedure: Subungual hematoma trephination, left fourth finger.    Indication: Painful subungual hematoma following crush injury    Consent: Verbal consent obtained.  Risks, benefits and alternative discussed with the patient.    Procedure details: The left fourth distal phalanx was examined and found to have is a subungual hematoma with associated distal phalanx fracture on x-ray.  The nail was intact with no evidence of avulsion or gross contamination.  The nail was cleaned with alcohol swab.  Sterile cordless cautery device was used to create a small hole in the nail plate overlying the hematoma.  Evacuation of dark blood was noted with immediate pain relief and reported by the pain.    Complications: No complications encountered.      ED Medication and Procedure Management   Prior to Admission Medications   Prescriptions Last Dose Informant Patient Reported? Taking?   meloxicam (MOBIC) 15 mg tablet   No No   Sig: Take 1 tablet (15 mg total) by mouth daily      Facility-Administered Medications: None     Discharge Medication List as of 5/11/2025  3:35 PM        CONTINUE these medications which have  NOT CHANGED    Details   meloxicam (MOBIC) 15 mg tablet Take 1 tablet (15 mg total) by mouth daily, Starting Thu 9/16/2021, Until Sat 10/16/2021, Normal             ED SEPSIS DOCUMENTATION   Time reflects when diagnosis was documented in both MDM as applicable and the Disposition within this note       Time User Action Codes Description Comment    5/11/2025  3:30 PM Prachi Lou [T14.8XXA] Crush injury     5/11/2025  3:30 PM Prachi Lou [S60.10XA] Subungual hematoma of finger, initial encounter     5/11/2025  3:31 PM Parchi Lou [S62.639A] Closed fracture of tuft of distal phalanx of finger                  Prachi Lou PA-C  05/11/25 [id number]

## 2025-05-11 NOTE — DISCHARGE INSTRUCTIONS
Keep the finger clean and dry. Elevate the hand above heart level as much as possible for the next 24 to 48 hours to reduce swelling.  Use over-the-counter pain medication like Tylenol or ibuprofen as needed.  Keep the finger splinted for comfort and protection until you see orthopedics or your PCP.  Avoid heavy lifting or using the injured hand until cleared by specialist.    Please return to the emergency department if you are experiencing any new or worsening symptoms such as worsening pain, swelling, numbness, tingling, weakness, difficulty using your hand, signs of infection such as increased redness, pus, red streak up your arm, if you develop a fever or chills, etc.

## 2025-05-13 ENCOUNTER — OFFICE VISIT (OUTPATIENT)
Dept: OBGYN CLINIC | Facility: CLINIC | Age: 60
End: 2025-05-13
Payer: COMMERCIAL

## 2025-05-13 VITALS — BODY MASS INDEX: 34.99 KG/M2 | HEIGHT: 65 IN | WEIGHT: 210 LBS

## 2025-05-13 DIAGNOSIS — S60.10XA SUBUNGUAL HEMATOMA OF FINGER, INITIAL ENCOUNTER: ICD-10-CM

## 2025-05-13 DIAGNOSIS — S62.639A CLOSED FRACTURE OF TUFT OF DISTAL PHALANX OF FINGER: ICD-10-CM

## 2025-05-13 PROCEDURE — 99203 OFFICE O/P NEW LOW 30 MIN: CPT | Performed by: SURGERY

## 2025-05-13 NOTE — PROGRESS NOTES
Name: Katia Grey      : 1965      MRN: [id number]  Encounter Provider: Jonathon Wade MD  Encounter Date: 2025   Encounter department: [facility] [place]      Assessment & Plan  Closed fracture of tuft of distal phalanx of finger  Right ring finger distal tuft fracture, DOI 5/10/2025.  Recommend discontinuing the AlumaFoam splint and start range of motion of the finger and activities as tolerated.  No hard restrictions at this point.  We did discuss that hypersensitivity of the fingertip is typically the biggest issue moving forward and should improve with time.  May follow-up as needed.  Call the office with any questions or concerns.      Orders:    Ambulatory Referral to Orthopedic Surgery    Subungual hematoma of finger, initial encounter  Nail plate trephinated in ER with good relief.  Orders:    Ambulatory Referral to Orthopedic Surgery         History of Present Illness   Patient is a 59 y.o. female here for evaluation of the right ring finger.  According to the patient, she got this finger between 2 paving stones on 5/10/2025.  Swelling and went to the ER the next day where she was found to have a distal tuft fracture.  The subungual hematoma was also trephinated with good relief as well.  Currently she states the fingertip is hypersensitive.  She has been wearing the AlumaFoam splint.  Patient is Right Handed.      Review of Systems A 10 point ROS was performed; negative except as noted above.     Objective     Imaging  Right hand x-rays 2025    IMPRESSION:     Acute nondisplaced fracture of the right fourth distal phalanx tuft.    Physical Exam    General appearance:  NAD   Cardiac:  Regular rate  Lungs:  Unlabored breathing  Abdomen:  Non-distended    Orthopedic Exam:    Right ring finger    Inspection: Subungual hematoma present with nail plate intact.  + ecchymosis of the distal phalanx.  No open wounds.    Palpation:  + Hypersensitivity of the  fingertip    Range-of-motion: Full flexion extension of the DIP joint is achieved    Strength: Deferred    Sensation:  ILT m/r/u nerve distributions.    Special Tests:  Palpable radial pulse  UE warm and well perfused.  Good cap refill at the fingertip      Wyatt Garcia PA-C

## 2025-06-11 ENCOUNTER — OFFICE VISIT (OUTPATIENT)
Dept: PODIATRY | Facility: CLINIC | Age: 60
End: 2025-06-11
Payer: COMMERCIAL

## 2025-06-11 ENCOUNTER — APPOINTMENT (OUTPATIENT)
Dept: RADIOLOGY | Age: 60
End: 2025-06-11
Attending: PODIATRIST
Payer: COMMERCIAL

## 2025-06-11 VITALS — BODY MASS INDEX: 34.55 KG/M2 | HEIGHT: 66 IN | WEIGHT: 215 LBS

## 2025-06-11 DIAGNOSIS — M79.671 PAIN OF RIGHT HEEL: ICD-10-CM

## 2025-06-11 DIAGNOSIS — M76.61 INSERTIONAL TENDINOPATHY OF RIGHT ACHILLES TENDON: Primary | ICD-10-CM

## 2025-06-11 PROCEDURE — 73650 X-RAY EXAM OF HEEL: CPT

## 2025-06-11 PROCEDURE — 99203 OFFICE O/P NEW LOW 30 MIN: CPT | Performed by: PODIATRIST

## 2025-06-11 NOTE — PATIENT INSTRUCTIONS
" Purchase \"adjustable heel lift\" off [employer] to help get your achilles tendon from irritating the spurring. Make sure your shoes aren't tight in the back of the heel.     Voltaren gel is an over the counter anti-inflammatory cream. Safe to apply to the back of the heel 3-4 times per day to reduce pain/inflammation  "

## 2025-06-11 NOTE — PROGRESS NOTES
"Name: Katia Grey      : 1965      MRN: [id number]  Encounter Provider: Steffen Quintanilla DPM  Encounter Date: 2025   Encounter department: [facility]  :  Assessment & Plan  Insertional tendinopathy of right Achilles tendon  Diagnosis and options discussed with patient  Patient agreeable to the plan as stated below    1. Discussed diagnosis and treatment options  2. Provided home therapy and stretching exercises  3. Educated eccentric exercises for the achilles enthesopathy.   4. Stressed compliance with home AND formal Physical Therapy  5. Purchase \"adjustable heel lift\" off [employer].   6. Voltaren gel is an over the counter anti-inflammatory. Safe to apply to the back of the heel 2-3 times per day to reduce pain/inflammation  7. RTC 6 weeks    Orders:  •  XR heel / calcaneus 2+ vw right; Future  •  Ambulatory Referral to Physical Therapy; Future    XRay 2 views of the right heel personally read by Dr. Quintanilla in office today and discussed with patient:    There is no acute fracture or dislocation.  Posterior spurring calcaneus noted  No lytic or blastic osseous lesion.  Soft tissues are unremarkable.      History of Present Illness   HPI  Katia Grey is a 59 y.o. female who presents with right posterior heel pain. About a month ago she fell and broke her finger. At the time of the fall she thinks she tweaked her ankle. The pain in the back of the heel and slowly been getting worse so she thought she should have it checked. Her finger fracture is healing fine.       Review of Systems       Objective   Ht 5' 5.5\" (1.664 m)   Wt 97.5 kg (215 lb)   BMI 35.23 kg/m²      Physical Exam  Vitals reviewed.     Cardiovascular:      Rate and Rhythm: Normal rate.      Pulses: Normal pulses.   Pulmonary:      Effort: Pulmonary effort is normal. No respiratory distress.     Musculoskeletal:         General: Deformity present.     Skin:     General: Skin is warm. "      Findings: No erythema or rash.     Neurological:      Mental Status: She is alert.      Sensory: No sensory deficit.     Pain achilles insertion right posterior heel. No palpable defect in the achilles, crawford test normal. PF strength 5/5. Moderate ankle equinus

## 2025-07-25 ENCOUNTER — HOSPITAL ENCOUNTER (OUTPATIENT)
Dept: HOSPITAL 99 - RCS | Age: 60
End: 2025-07-25
Payer: COMMERCIAL

## 2025-07-25 DIAGNOSIS — R07.9: ICD-10-CM

## 2025-07-25 DIAGNOSIS — R94.31: ICD-10-CM

## 2025-07-25 DIAGNOSIS — R60.9: Primary | ICD-10-CM

## 2025-07-25 DIAGNOSIS — R06.09: ICD-10-CM

## 2025-07-25 DIAGNOSIS — R00.2: ICD-10-CM

## 2025-08-14 ENCOUNTER — OFFICE VISIT (OUTPATIENT)
Dept: PODIATRY | Facility: CLINIC | Age: 60
End: 2025-08-14
Payer: COMMERCIAL

## 2025-08-19 ENCOUNTER — EVALUATION (OUTPATIENT)
Dept: PHYSICAL THERAPY | Facility: REHABILITATION | Age: 60
End: 2025-08-19
Attending: PODIATRIST
Payer: COMMERCIAL

## 2025-08-19 DIAGNOSIS — M76.61 INSERTIONAL TENDINOPATHY OF RIGHT ACHILLES TENDON: Primary | ICD-10-CM

## 2025-08-19 PROCEDURE — 97161 PT EVAL LOW COMPLEX 20 MIN: CPT

## 2025-08-19 PROCEDURE — 97110 THERAPEUTIC EXERCISES: CPT
